# Patient Record
Sex: FEMALE | Employment: FULL TIME | URBAN - METROPOLITAN AREA
[De-identification: names, ages, dates, MRNs, and addresses within clinical notes are randomized per-mention and may not be internally consistent; named-entity substitution may affect disease eponyms.]

---

## 2017-01-04 ENCOUNTER — TELEPHONE (OUTPATIENT)
Dept: ORTHOPEDICS | Facility: CLINIC | Age: 52
End: 2017-01-04

## 2017-01-04 NOTE — TELEPHONE ENCOUNTER
Spoke with patient. She stepped funny this weekend and has had pain and stiffness in her knee since that time.  Has tried to use compression. Getting slightly better each day.  Discussed use of supportive care, ibuprofen, ice, compression.  If not better in the next day or 2 she will follow up.  Lucy Inman MS ATC

## 2017-01-04 NOTE — TELEPHONE ENCOUNTER
Patient LVM. She injured her knee the other day and is wondering if she should be concerned. Would like a call back.

## 2017-01-06 ENCOUNTER — OFFICE VISIT (OUTPATIENT)
Dept: ORTHOPEDICS | Facility: CLINIC | Age: 52
End: 2017-01-06
Payer: COMMERCIAL

## 2017-01-06 VITALS
HEIGHT: 67 IN | BODY MASS INDEX: 34.37 KG/M2 | SYSTOLIC BLOOD PRESSURE: 128 MMHG | WEIGHT: 219 LBS | DIASTOLIC BLOOD PRESSURE: 76 MMHG

## 2017-01-06 DIAGNOSIS — M17.12 PRIMARY OSTEOARTHRITIS OF LEFT KNEE: Primary | ICD-10-CM

## 2017-01-06 PROCEDURE — 99213 OFFICE O/P EST LOW 20 MIN: CPT | Performed by: PEDIATRICS

## 2017-01-06 NOTE — PROGRESS NOTES
"Sports Medicine Clinic Visit    PCP: [facility], [place] [facility]    Magdalena Gordon is a 51 year old female who is seen in f/u up for Primary osteoarthritis of left knee. Since last visit on 16 with Dr. Lerma for a aspiration/injection of a Baker's cyst, the patient has continued to have pain in her left knee.  She did have a slip when she was going down some steps and feel like she abby her knee last week.  She has had pain and soreness in her left knee and thigh since that time.  She has used compression, ice and heat.  She did see her acupuncturist, and does feel that this has helped with the swelling.     Most sore in the anterior aspect of her knee.   **  Was descending stairs, wearing compression at the time, and abby knee. Noted some anterior discoloration, and joint swelling. Swelling now improving. Still has some joint area pain and tenderness.  Limping. Has cut back on activities.  Pain currently is different than typical OA pain. Has noted some benefit in that from acupuncture treatment.  Has noted stiffness since onset of this flare.  Thigh cramped yesterday, used some heat.  Has had knee swelling.  Has had some snapping at knee. Pain more anterolateral aspect, radiates to distal thigh. Also has focal anteromedial pain, tender over anteromedial joint line.    Sitting longer is more painful, causes stiffness. Has noted limited flexion.    Review of Systems  All other systems reviewed and are negative unless noted above.    Past Medical History   Diagnosis Date     Uncomplicated asthma      Circulation problem      Hyperlipemia      Thyroid disease      Headache, migraine      Past Surgical History   Procedure Laterality Date      section, immediate hysterectomy, combined        section, immediate hysterectomy, combined       Thyroid biopsy         Objective  /76 mmHg  Ht 5' 7\" (1.702 m)  Wt 219 lb (99.338 kg)  BMI 34.29 kg/m2    GENERAL APPEARANCE: healthy, alert " and no distress   GAIT: antalgic  SKIN: no suspicious lesions or rashes  NEURO: Normal strength and tone, mentation intact and speech normal  PSYCH:  mentation appears normal and affect normal/bright  HEENT: no scleral icterus or conjunctival erythema  CV: no extremity edema  RESP: nonlabored breathing      Left Knee exam    ROM:        Mild limitation 5-10 deg flexion, stiffness, pain  Extension grossly full, pain actively    Inspection:       no visible ecchymosis      Trace/mild effusion    Skin:       no visible deformities       well perfused       capillary refill brisk    Patellar Motion:        Mild crepitus PF compartment    Tender:        Medial and lateral joint line  Peripatellar  Mild pain with patellar translation    Non Tender:         remainder of knee area    Special Tests:        Mild lateral pain with Hardeep       neg (-) Lachman       neg (-) anterior drawer       neg (-) posterior drawer       neg (-) varus       neg (-) valgus for laxity        Radiology  Prior imaging visualized/reviewed:    MR LEFT KNEE WITHOUT CONTRAST  2/23/2016 9:08 AM     HISTORY:  Evaluate posterior knee, distal hamstring, popliteal space.  Pain in left knee.       TECHNIQUE:  Axial and coronal T2 with fat suppression. Coronal T1.  Sagittal dual echo T2.     FINDINGS:    Medial Meniscus: Mild extrusion of the anterior horn. Moderate  extrusion of the body segment. Intrasubstance degeneration with no  definite articular surface tear, using strict criteria.       Lateral Meniscus: No tear identified.         Anterior Cruciate Ligament: Intact.       Posterior Cruciate Ligament: Intact.       Medial Collateral Ligament: Intact.     Lateral Collateral Ligament Complex: Intact.     Osseous and Cartilaginous Structures: There is a 0.8 cm lesion of the  distal femoral metaphyseal region. The appearance is typical for a  low-grade cartilaginous tumor (often enchondroma), with no aggressive  features seen. Three-compartment  spurring. Spurring around the notch.  Grade II chondromalacia of the lateral tibial plateau. Medial  compartment chondromalacia including some grade IV involvement on both  sides of the joint. Grade II-III patellar chondromalacia. Trochlear  chondromalacia including some grade IV involvement of the medial  trochlea.     Additional Findings: Mild joint effusion. Small Baker's cyst. Fluid in  the semimembranosus-tibial collateral ligament bursa. There is a  ganglion cyst at the posterior aspect of the knee measuring 2.5 cm.  Synovitis.         IMPRESSION:  1. Three-compartment chondromalacia.  2. Mild effusion, with synovitis.  3. Small Baker's cyst.  4. Semimembranosus bursitis.     DANIELA VALENTIN MD      Plain films knees 2/19/16:   Impression: Bilateral Vergara and axial views demonstrate moderate to severe medial compartment narrowing, slightly greater on the right. Mild lateral and patellofemoral compartment degenerative change as well. Tricompartmental osteophytes. No acute osseous findings.  Impression: Single lateral view of the left knee demonstrates diffuse degenerative change, with femorotibial joint space narrowing and patellar osteophytes. No acute osseous findings.      Assessment:  1. Primary osteoarthritis of left knee    aggravation of OA, with pain more from the patellofemoral compartment; this fits given onset with descending stairs.    Plan:  Discussed the assessment with the patient. Reviewed again options for OA of knee. Discussed symptom treatment, activity modification, rehab, imaging, injection therapy, referral to see surgeon.  Topical Treatments: Ice, Heat or Topical Analgesics  Over the counter medication: Patient's preferred OTC medication as directed on packaging.  Prior imaging of the knee reviewed. I don't think additional imaging necessary currently.  Activity Modification: discussed  Rehab: Physical Therapy: external, her request; PT to address her sense of stiffness, instability, LE  strength and control  Injection therapy discussed. Steroid x 2 without lasting benefit. Discussed potential for visco, though she has an allergy to down. Following last phone encounter, I had looked through medical literature regarding this, no specific mention of down. Will reach out to visco rep(s) about this potential, though with her significant systemic reaction and history of atopic disease, I would be very apprehensive to pursue visco.  She is aware ultimate tx option is see ortho surgeon; desires to avoid as long as possible.  Follow up: will leave as needed, pending course with PT.  Questions answered. The patient indicates understanding of these issues and agrees with the plan.    Vishnu Hanson DO, CAQ    Note: Time spent in one-on-one evaluation and discussion with patient regarding nature of problem, course, prior treatments, and therapeutic options, along with review of medical record, at least 50% of which was spent in counseling and coordination of care: 15 minutes.      Disclaimer: This note consists of symbols derived from keyboarding, dictation and/or voice recognition software. As a result, there may be errors in the script that have gone undetected. Please consider this when interpreting information found in this chart.

## 2017-01-06 NOTE — NURSING NOTE
"Chief Complaint   Patient presents with     Musculoskeletal Problem     left knee pain       Initial /76 mmHg  Ht 5' 7\" (1.702 m)  Wt 219 lb (99.338 kg)  BMI 34.29 kg/m2 Estimated body mass index is 34.29 kg/(m^2) as calculated from the following:    Height as of this encounter: 5' 7\" (1.702 m).    Weight as of this encounter: 219 lb (99.338 kg).  BP completed using cuff size: regular  "

## 2017-01-06 NOTE — Clinical Note
Can we reach out to visco rep(s) about potential use of visco in setting of down allergy? I can email, just need contact info. Thanks.

## 2017-01-06 NOTE — MR AVS SNAPSHOT
After Visit Summary   1/6/2017    Magdalena Gordon    MRN: [id number]           Patient Information     Date Of Birth          1965        Visit Information        Provider Department      1/6/2017 9:20 AM Vishnu Hanson,  [facility]        Today's Diagnoses     Primary osteoarthritis of left knee    -  1        Follow-ups after your visit        Additional Services     PHYSICAL THERAPY REFERRAL       *This therapy referral will be filtered to a centralized scheduling office at [facility] and the patient will receive a call to schedule an appointment at a [facility] location most convenient for them. *     [facility] provides Physical Therapy evaluation and treatment and many specialty services across the [facility] system.  If requesting a specialty program, please choose from the list below.    If you have not heard from the scheduling office within 2 business days, please call [phone] for all locations, with the exception of [place], please call [phone].  Treatment: Evaluation & Treatment  Special Instructions/Modalities: [facility]    [facility]  [address]  [place] [address]    PH: [phone]  FX: [phone]  Special Programs: None    Please be aware that coverage of these services is subject to the terms and limitations of your health insurance plan.  Call member services at your health plan with any benefit or coverage questions.      **Note to Provider:  If you are referring outside of [facility] for the therapy appointment, please list the name of the location in the  special instructions  above, print the referral and give to the patient to schedule the appointment.                  Who to contact     If you have questions or need follow up information about today's clinic visit or your schedule please contact [facility] directly at [phone].  Normal or non-critical lab and imaging results will be communicated to you by MyChart, letter or phone within 4 business days after the clinic has received the results. If you do not hear from us within 7 days, please contact the clinic through [email or web address] or phone. If you have a critical or abnormal lab result, we will notify you by phone as soon as possible.  Submit refill requests through [employer] or call your pharmacy and they will forward the refill request to us. Please allow 3 business days for your refill to be completed.          Additional Information About Your Visit        [email or web address] Information     [employer] gives you secure access to your electronic health record. If you see a primary care provider, you can also send messages to your care team and make appointments. If you have questions, please call your primary care clinic.  If you do not have a primary care provider, please call [phone] and they will assist you.        Care EveryWhere ID     This is your Care EveryWhere ID. This could be used by other organizations to access your [facility] medical records  [id number]        Your Vitals Were     Height BMI (Body Mass Index)                5' 7\" (1.702 m) 34.29 kg/m2           Blood Pressure from Last 3 Encounters:   01/06/17 128/76   12/02/16 130/80   09/09/16 136/82    Weight from Last 3 Encounters:   01/06/17 219 lb (99.338 kg)   12/02/16 219 lb (99.338 kg)   09/09/16 220 lb (99.791 kg)              We Performed the Following     PHYSICAL THERAPY REFERRAL        Primary Care Provider Office Phone #    [facility] [phone]       No address on file        Thank you!     Thank you for choosing [facility]  for your care. Our goal is always to provide you with excellent care. Hearing back from our patients is one way we can continue to improve our services. Please take a few minutes to complete the written " survey that you may receive in the mail after your visit with us. Thank you!             Your Updated Medication List - Protect others around you: Learn how to safely use, store and throw away your medicines at www.disposemymeds.org.          This list is accurate as of: 1/6/17 10:19 AM.  Always use your most recent med list.                   Brand Name Dispense Instructions for use    SYNTHROID PO

## 2017-09-10 ENCOUNTER — HEALTH MAINTENANCE LETTER (OUTPATIENT)
Age: 52
End: 2017-09-10

## 2018-09-28 ENCOUNTER — THERAPY VISIT (OUTPATIENT)
Dept: PHYSICAL THERAPY | Facility: CLINIC | Age: 53
End: 2018-09-28
Payer: COMMERCIAL

## 2018-09-28 DIAGNOSIS — H81.11 BENIGN PAROXYSMAL POSITIONAL VERTIGO OF RIGHT EAR: Primary | ICD-10-CM

## 2018-09-28 PROCEDURE — 95992 CANALITH REPOSITIONING PROC: CPT | Mod: GP | Performed by: PHYSICAL THERAPIST

## 2018-09-28 PROCEDURE — 97112 NEUROMUSCULAR REEDUCATION: CPT | Mod: GP | Performed by: PHYSICAL THERAPIST

## 2018-09-28 PROCEDURE — 97161 PT EVAL LOW COMPLEX 20 MIN: CPT | Mod: GP | Performed by: PHYSICAL THERAPIST

## 2018-09-28 NOTE — MR AVS SNAPSHOT
After Visit Summary   9/28/2018    Magdalena Gordon    MRN: [id number]           Patient Information     Date Of Birth          1965        Visit Information        Provider Department      9/28/2018 4:00 PM Vasiliy Rouse, PT [facility]        Today's Diagnoses     Benign paroxysmal positional vertigo of right ear    -  1       Follow-ups after your visit        Who to contact     If you have questions or need follow up information about today's clinic visit or your schedule please contact [facility] directly at [phone].  Normal or non-critical lab and imaging results will be communicated to you by [employer], letter or phone within 4 business days after the clinic has received the results. If you do not hear from us within 7 days, please contact the clinic through [employer] or phone. If you have a critical or abnormal lab result, we will notify you by phone as soon as possible.  Submit refill requests through [employer] or call your pharmacy and they will forward the refill request to us. Please allow 3 business days for your refill to be completed.          Additional Information About Your Visit        MyChart Information     [employer] gives you secure access to your electronic health record. If you see a primary care provider, you can also send messages to your care team and make appointments. If you have questions, please call your primary care clinic.  If you do not have a primary care provider, please call [phone] and they will assist you.        Care EveryWhere ID     This is your Care EveryWhere ID. This could be used by other organizations to access your [facility] medical records  [id number]         Blood Pressure from Last 3 Encounters:   01/06/17 128/76   12/02/16 130/80   09/09/16 136/82    Weight from Last 3 Encounters:   01/06/17 99.3 kg (219 lb)   12/02/16 99.3 kg (219 lb)   09/09/16 99.8 kg (220 lb)              We Performed the Following     CANALITH  REPOSITIONING, PER DAY     HC PT EVAL, LOW COMPLEXITY     [facility] INITIAL EVAL REPORT     NEUROMUSCULAR RE-EDUCATION        Primary Care Provider Office Phone # Fax #    [facility] [phone]       [address]  [place] [address]        Equal Access to Services     DARLENE LOZANO : Hadii aad ku hadasho Soomaali, waaxda luqadaha, qaybta kaalmada adeegyada, waxay idiin hayaan adeeg kharash la'aan ah. So [facility] [phone].    ATENCIÓN: Si habla español, tiene a suh disposición servicios gratuitos de asistencia lingüística. Llame al [phone].    We comply with applicable federal civil rights laws and [place] laws. We do not discriminate on the basis of race, color, national origin, age, disability, sex, sexual orientation, or gender identity.            Thank you!     Thank you for choosing [facility]  for your care. Our goal is always to provide you with excellent care. Hearing back from our patients is one way we can continue to improve our services. Please take a few minutes to complete the written survey that you may receive in the mail after your visit with us. Thank you!             Your Updated Medication List - Protect others around you: Learn how to safely use, store and throw away your medicines at www.disposemymeds.org.          This list is accurate as of 9/28/18  5:29 PM.  Always use your most recent med list.                   Brand Name Dispense Instructions for use Diagnosis    SYNTHROID PO

## 2018-09-28 NOTE — LETTER
[facility]  [address]  [address]  [place] [address]  [phone]    2018    Re: Magdalena Gordon   :   1965  MRN:  [id number]   REFERRING PHYSICIAN:   [facility]  Date of Initial Evaluation:  2018  Visits:  Rxs Used: 1  Reason for Referral:  Benign paroxysmal positional vertigo of right ear    EVALUATION SUMMARY    [facility] Initial Evaluation  Subjective:  Magdalena is a 53 year old patient complaining of dizziness .  Onset of symptoms: 3 days ago       Is this a recurrent problem: yes, 3rd episode   Progression since onset: same   Frequency of episodes/time of day: varies   Duration of episodes: brief   Exacerbating factors: movement   Relieving factors: rest   Previous treatments:  Yes   Special tests/diagnostics performed: yes   Significant medical hx: bppv meds: none   Occupation: manager    Work requirements: none, but does yoga   General health reported by patient: good   Red Flags: none                   Objective:  Cervical ROM screen: neg   Oculomotor/gaze stability screen: +saccodes Rt   Vertebral artery test: NA   Hallpike-Cecil maneuver:  R: +++positive w/ nystagmus   L: neg   Horizontal canal test:  R: ??post   L: neg   Balance testing:  NA     Assessment/Plan:    Patient is a 53 year old female with dizziness  complaints.    Patient has the following significant findings with corresponding treatment plan.                Diagnosis 1:  bppv   Impaired balance - neuro re-education, therapeutic activities and CRM             Re: Magdalena Gordon   :   1965    Therapy Evaluation Codes:   1) History comprised of:   Personal factors that impact the plan of care:      Coping style, Overall behavior pattern and Past/current experiences.    Comorbidity factors that impact the plan of care are:      Asthma, Cancer, Menopausal, Migraines/headaches, Osteoarthritis, Overweight and thyroid.     Medications impacting  care: None.  2) Examination of Body Systems comprised of:   Body structures and functions that impact the plan of care:      vestib .   Activity limitations that impact the plan of care are:      Bending, Squatting/kneeling, Walking and Sleeping.  3) Clinical presentation characteristics are:   Stable/Uncomplicated.  4) Decision-Making    Low complexity using standardized patient assessment instrument and/or measureable assessment of functional outcome.  Cumulative Therapy Evaluation is: Low complexity.    Previous and current functional limitations:  (See Goal Flow Sheet for this information)    Short term and Long term goals: (See Goal Flow Sheet for this information)     Communication ability:  Patient appears to be able to clearly communicate and understand verbal and written communication and follow directions correctly.  Treatment Explanation - The following has been discussed with the patient:   RX ordered/plan of care  Anticipated outcomes  Possible risks and side effects  This patient would benefit from PT intervention to resume normal activities.   Rehab potential is good.    Frequency:  1 X week, once daily  Duration:  for 4 weeks  Discharge Plan:  Achieve all LTG.  Independent in home treatment program.  Reach maximal therapeutic benefit.    Please refer to the daily flowsheet for treatment today, total treatment time and time spent performing 1:1 timed codes.       Thank you for your referral.    INQUIRIES  Therapist: Vasiliy Rouse PT   [facility]  [address]  [address]  [place] [address]  Phone: [phone]  Fax: [phone]

## 2018-09-28 NOTE — PROGRESS NOTES
[facility] Initial Evaluation  Subjective:  HPI                    Objective:  System    Physical Exam    General     ROS    Assessment/Plan:    Patient is a 53 year old female with dizziness  complaints.    Patient has the following significant findings with corresponding treatment plan.                Diagnosis 1:  bppv   Impaired balance - neuro re-education, therapeutic activities and CRM     Therapy Evaluation Codes:   1) History comprised of:   Personal factors that impact the plan of care:      Coping style, Overall behavior pattern and Past/current experiences.    Comorbidity factors that impact the plan of care are:      Asthma, Cancer, Menopausal, Migraines/headaches, Osteoarthritis, Overweight and thyroid.     Medications impacting care: None.  2) Examination of Body Systems comprised of:   Body structures and functions that impact the plan of care:      vestib .   Activity limitations that impact the plan of care are:      Bending, Squatting/kneeling, Walking and Sleeping.  3) Clinical presentation characteristics are:   Stable/Uncomplicated.  4) Decision-Making    Low complexity using standardized patient assessment instrument and/or measureable assessment of functional outcome.  Cumulative Therapy Evaluation is: Low complexity.    Previous and current functional limitations:  (See Goal Flow Sheet for this information)    Short term and Long term goals: (See Goal Flow Sheet for this information)     Communication ability:  Patient appears to be able to clearly communicate and understand verbal and written communication and follow directions correctly.  Treatment Explanation - The following has been discussed with the patient:   RX ordered/plan of care  Anticipated outcomes  Possible risks and side effects  This patient would benefit from PT intervention to resume normal activities.   Rehab potential is good.    Frequency:  1 X week, once daily  Duration:  for 4 weeks  Discharge  Plan:  Achieve all LTG.  Independent in home treatment program.  Reach maximal therapeutic benefit.    Please refer to the daily flowsheet for treatment today, total treatment time and time spent performing 1:1 timed codes.       SShyann Nichols is a 53 year old patient complaining of dizziness .  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

## 2018-10-15 ENCOUNTER — TELEPHONE (OUTPATIENT)
Dept: ENDOCRINOLOGY | Facility: CLINIC | Age: 53
End: 2018-10-15

## 2018-10-15 NOTE — TELEPHONE ENCOUNTER
Message left on voicemail to obtain pathology, surgery, and treatment records. Recently seen at [facility] per Care Everywhere.     Encouraged to return call with any questions.    Hortensia Terrazas LPN  Clinic Coordinator   Adult Endocrinology and Pediatric Speciality Clinics  [facility]

## 2018-10-15 NOTE — TELEPHONE ENCOUNTER
[facility]    Phone Message    May a detailed message be left on voicemail: yes    Reason for Call: Other: Patient is new to Dr. Balderas has an appt on 1/22/19 and would like a call to [place] what records to send. She had thyroid cancer 33 years ago and she just wants to make sure she sends all the correct records. Please call [phone]     Action Taken: Message routed to:  Adult Clinics: Endocrinology p [id number]

## 2018-10-31 ENCOUNTER — OFFICE VISIT (OUTPATIENT)
Dept: ENDOCRINOLOGY | Facility: CLINIC | Age: 53
End: 2018-10-31
Payer: COMMERCIAL

## 2018-10-31 VITALS
HEART RATE: 62 BPM | SYSTOLIC BLOOD PRESSURE: 147 MMHG | BODY MASS INDEX: 35.39 KG/M2 | WEIGHT: 225.97 LBS | DIASTOLIC BLOOD PRESSURE: 87 MMHG | OXYGEN SATURATION: 96 %

## 2018-10-31 DIAGNOSIS — R73.03 PREDIABETES: ICD-10-CM

## 2018-10-31 DIAGNOSIS — E89.0 POSTSURGICAL HYPOTHYROIDISM: ICD-10-CM

## 2018-10-31 DIAGNOSIS — I10 BENIGN ESSENTIAL HYPERTENSION: ICD-10-CM

## 2018-10-31 DIAGNOSIS — C73 PAPILLARY THYROID CARCINOMA (H): Primary | ICD-10-CM

## 2018-10-31 LAB
T4 FREE SERPL-MCNC: 1.41 NG/DL (ref 0.76–1.46)
TSH SERPL DL<=0.005 MIU/L-ACNC: 0.66 MU/L (ref 0.4–4)

## 2018-10-31 PROCEDURE — 99000 SPECIMEN HANDLING OFFICE-LAB: CPT | Performed by: INTERNAL MEDICINE

## 2018-10-31 PROCEDURE — 99204 OFFICE O/P NEW MOD 45 MIN: CPT | Performed by: INTERNAL MEDICINE

## 2018-10-31 PROCEDURE — 86800 THYROGLOBULIN ANTIBODY: CPT | Mod: 90 | Performed by: INTERNAL MEDICINE

## 2018-10-31 PROCEDURE — 84443 ASSAY THYROID STIM HORMONE: CPT | Performed by: INTERNAL MEDICINE

## 2018-10-31 PROCEDURE — 84439 ASSAY OF FREE THYROXINE: CPT | Performed by: INTERNAL MEDICINE

## 2018-10-31 PROCEDURE — 36415 COLL VENOUS BLD VENIPUNCTURE: CPT | Performed by: INTERNAL MEDICINE

## 2018-10-31 PROCEDURE — 84432 ASSAY OF THYROGLOBULIN: CPT | Mod: 90 | Performed by: INTERNAL MEDICINE

## 2018-10-31 RX ORDER — ASPIRIN 325 MG
325 TABLET, DELAYED RELEASE (ENTERIC COATED) ORAL DAILY
COMMUNITY

## 2018-10-31 RX ORDER — FLUTICASONE PROPIONATE 50 MCG
1 SPRAY, SUSPENSION (ML) NASAL DAILY
COMMUNITY

## 2018-10-31 RX ORDER — MULTIVITAMIN,THER AND MINERALS
1 TABLET ORAL DAILY
COMMUNITY

## 2018-10-31 RX ORDER — CHOLECALCIFEROL (VITAMIN D3) 50 MCG
2000 TABLET ORAL DAILY
COMMUNITY

## 2018-10-31 RX ORDER — PHENOL 1.4 %
1 AEROSOL, SPRAY (ML) MUCOUS MEMBRANE DAILY
COMMUNITY

## 2018-10-31 NOTE — MR AVS SNAPSHOT
After Visit Summary   10/31/2018    Magdalena Gordon    MRN: [id number]           Patient Information     Date Of Birth          1965        Visit Information        Provider Department      10/31/2018 9:00 AM Peggy Balderas MD [facility]        Today's Diagnoses     Papillary thyroid carcinoma (H)    -  1    Prediabetes          Care Instructions    Please allow 7-10 business days for your lab results. You will be notified by phone, letter, or My Chart after the provider has reviewed them.  Thank you.             Follow-ups after your visit        Follow-up notes from your care team     Return in about 1 year (around 10/31/2019) for labs today.      Your next 10 appointments already scheduled     Oct 30, 2019  8:00 AM CDT   Return Visit with Peggy Balderas MD   [facility]    [address] [place] [id number] [phone]              Future tests that were ordered for you today     Open Future Orders        Priority Expected Expires Ordered    TSH Routine 10/31/2018 10/31/2019 10/31/2018    T4 free Routine 10/31/2018 10/31/2019 10/31/2018    Thyroglobulin and antibody Routine 10/31/2018 10/31/2019 10/31/2018            Who to contact     If you have questions or need follow up information about today's clinic visit or your schedule please contact [facility] directly at [phone].  Normal or non-critical lab and imaging results will be communicated to you by MyChart, letter or phone within 4 business days after the clinic has received the results. If you do not hear from us within 7 days, please contact the clinic through MyChart or phone. If you have a critical or abnormal lab result, we will notify you by phone as soon as possible.  Submit refill requests through [employer] or call your pharmacy and they will forward the refill request to us. Please allow 3 business days for  your refill to be completed.          Additional Information About Your Visit        [employer] Information     [employer] gives you secure access to your electronic health record. If you see a primary care provider, you can also send messages to your care team and make appointments. If you have questions, please call your primary care clinic.  If you do not have a primary care provider, please call [phone] and they will assist you.      [employer] is an electronic gateway that provides easy, online access to your medical records. With [employer], you can request a clinic appointment, read your test results, renew a prescription or communicate with your care team.     To access your existing account, please contact your [facility] or call [phone] for assistance.        Care EveryWhere ID     This is your Care EveryWhere ID. This could be used by other organizations to access your [place] medical records  [id number]        Your Vitals Were     Pulse Pulse Oximetry BMI (Body Mass Index)             62 96% 35.39 kg/m2          Blood Pressure from Last 3 Encounters:   10/31/18 147/87   01/06/17 128/76   12/02/16 130/80    Weight from Last 3 Encounters:   10/31/18 102.5 kg (225 lb 15.5 oz)   01/06/17 99.3 kg (219 lb)   12/02/16 99.3 kg (219 lb)               Primary Care Provider Office Phone # Fax #    Alee Graves [phone]       [facility] [address] [facility] [address]        Equal Access to Services     DARLENE LOZANO AH: Hadii xiao ku hadasho Soomaali, waaxda luqadaha, qaybta kaalmada adeegyada, michael pierce. So [facility] [phone].    ATENCIÓN: Si habla español, tiene a suh disposición servicios gratuitos de asistencia lingüística. Kelby al [phone].    We comply with applicable federal civil rights laws and [place] laws. We do not discriminate on the basis of race, color, national origin, age, disability, sex, sexual orientation,  or gender identity.            Thank you!     Thank you for choosing [facility]  for your care. Our goal is always to provide you with excellent care. Hearing back from our patients is one way we can continue to improve our services. Please take a few minutes to complete the written survey that you may receive in the mail after your visit with us. Thank you!             Your Updated Medication List - Protect others around you: Learn how to safely use, store and throw away your medicines at www.disposemymeds.org.          This list is accurate as of 10/31/18 10:31 AM.  Always use your most recent med list.                   Brand Name Dispense Instructions for use Diagnosis    ATENOLOL PO      Take 25 mg by mouth daily        NEMESIO ASPIRIN PO      Take 325 mg by mouth daily        calcium carbonate 600 MG tablet    OS-BASSEM 600 mg [place]. [place]     Take 1 tablet by mouth daily        fluticasone 50 MCG/ACT spray    FLONASE     Spray 1 spray into both nostrils daily        fluticasone-salmeterol 250-50 MCG/DOSE diskus inhaler    ADVAIR     Inhale 1 puff into the lungs daily        GLUCOSAMINE-CHONDROITIN-MSM PO      Take 1 tablet by mouth daily        MONTELUKAST SODIUM PO      Take 10 mg by mouth At Bedtime        SYNTHROID PO      Take 175 mcg by mouth daily        vitamin  s/Minerals Tabs      Take 1 tablet by mouth daily        vitamin D 2000 units tablet      Take 2,000 Units by mouth daily

## 2018-10-31 NOTE — NURSING NOTE
Magdalena Gordon's goals for this visit include: consult - thyroid cancer  She requests these members of her care team be copied on today's visit information: Yes    PCP: Alee Graves    Referring Provider:  Referred Self, MD  No address on file    /87 (BP Location: Left arm, Patient Position: Sitting, Cuff Size: Adult Regular)  Pulse 62  Wt 102.5 kg (225 lb 15.5 oz)  SpO2 96%  BMI 35.39 kg/m2    Do you need any medication refills at today's visit? Yes

## 2018-10-31 NOTE — LETTER
10/31/2018         RE: Magdalena Gordon  [address] [facility] [address]        Dear Colleague,    Thank you for referring your patient, Magdalena Gordon, to the [facility]. Please see a copy of my visit note below.      The patient is self-referred for evaluation of thyroid cancer.    Magdalena Gordon is a 53 year old female with a past medical history significant for paroxysmal A. fib, hypertension, headaches, obstructive sleep apnea.  The patient was diagnosed with thyroid cancer in 1985.  I reviewed the printed copy of the outside medical records:  11/7/85 right thyroid lobectomy.  Frozen section at that time was interpreted as probably benign.  Permanent sections revealed papillary carcinoma of the thyroid and the patient underwent elective left thyroid lobectomy the following day.  Pathology report:  Hashimoto thyroiditis  Follicular variant of papillary thyroid carcinoma, 1.5 cm  3 benign peritracheal lymph nodes  A focus of papillary thyroid carcinoma in the left lobe  4/17/86 an ablative dose of 28.5 mCi I-131 was administered.  4/14/86 whole body scan performed 48 hours following I-131 administration.  There was an intense focal area of uptake noted in the thyroid bed, consistent with residual thyroid.  7/30/87 whole body I-131 scan demonstrated no abnormal areas of increased activity.  10/26/88 I-131 whole body scan-no evidence of residual activity in the thyroid area.  No areas of distant metastasis identified.  2/25/2003  Thyroglobulin antibodies less than 2, thyroglobulin less than 0.9 ([facility])    I reviewed outside lab results and imaging tests in care everywhere:  10/21/85   TgAb 1:400 <1:100  TPO antibodies 1:1600   <1:100    5/1/18 TSH 0.61     8/1/18   TSH 0.97 Tg <0.1 TgAb <0.1 ([facility])    2009 chest CT - no nodules     Current dose of levothyroxine is 175 mcg brand Synthroid and the dose has not been changed for years.  She reports  "taking it in the morning, 2 hours prior to having something to eat.  She takes all her supplements at lunch.  In , the patient reports being diagnosed with A. fib, at the time when her TSH was low and she was medicated with generic levothyroxine.  The atrial fibrillation resolved until May this year, when she underwent cardioversion.  Over the summer, she reports having recurrent episodes of atrial fibrillation, short-lived.  She recognizes her heart is in A. fib as she feels like \"the chest has carbonation problems\" and she also develops shortness of breath.  Denies experiencing palpitations.  She knows how to check her heart rate and she reports her heart rate being elevated at the time she experiences the episodes of A. fib.     Magdalena remembers being first told that she has an elevated blood pressure approximately 2 years ago.  As per patient, her blood pressure has been elevated for the last year.  Since being diagnosed with A. fib, she has been taking atenolol.    She has been trying to improve her diet and have less carbohydrates.  Recently, she has noticed some mild jitteriness and increased anxiety.    Past Medical History   Papillary thyroid cancer   Headaches   Asthma   Varicose veins  Allergic rhinitis  PREET mild - uses a mouth guard   Paroxsymal atrial fibrillation  Gestational diabetes with her third child  - missed during the pregnancy and not treated , baby was over 10 lbs weight at delivery   Prediabetes - dx 2005 DEXA normal BMD   Uterine fibroids   Menopause at age 45. 2008 FSH 63.8   Iron deficiency   No fractures   Deviated septum   HTN - BP high for 2 years, in the office - not treated     Past Surgical History   Past Surgical History:   Procedure Laterality Date      SECTION, IMMEDIATE HYSTERECTOMY, COMBINED        SECTION, IMMEDIATE HYSTERECTOMY, COMBINED       THYROID BIOPSY     Benign left breast mass removed surgically     Current " Medications  Prescription Medications as of 10/31/2018             ATENOLOL PO Take 25 mg by mouth daily    NEMESIO ASPIRIN PO Take 325 mg by mouth daily    calcium carbonate (OS-BASSEM 600 MG [place]. [place]) 600 MG tablet Take 1 tablet by mouth daily    Cholecalciferol (VITAMIN D) 2000 units tablet Take 2,000 Units by mouth daily    fluticasone (FLONASE) 50 MCG/ACT spray Spray 1 spray into both nostrils daily    fluticasone-salmeterol (ADVAIR) 250-50 MCG/DOSE diskus inhaler Inhale 1 puff into the lungs daily    GLUCOSAMINE-CHONDROITIN-MSM PO Take 1 tablet by mouth daily    Levothyroxine Sodium (SYNTHROID PO) Take 175 mcg by mouth daily    MONTELUKAST SODIUM PO Take 10 mg by mouth At Bedtime    vitamin  s/Minerals TABS Take 1 tablet by mouth daily          Family History   Problem Relation Age of Onset     Hypothyroidism  Mother      Diabetes type 2  Father      A fib  Father      Diabetes type 2  Maternal Grandfather      Coronary Artery Disease -  of MI in his 70s  Maternal Grandfather      Hashimoto thyroiditis  Sister      Social History  . She has 3 children. She smoked for 30 years, 1 PPD or less, quit 7 years ago. She rarely drinks alcohol. Denies using illicit drugs. 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      Review of Systems   Systemic:              Fatigue, longstanding - hard time getting up in am, occasional headaches in am - at the back of the head ~ once a week  Her weight is up 4 pounds in the last year  Eye:                      No eye symptoms   Denise-Laryngeal:     no dysphagia - occasionally, certain foods get stuck in her throat; voice hoarseness since using the steroid inhaler, no cough     Breast:                  No breast symptoms  Cardiovascular:    As above   Pulmonary:           Some SOB with exertion    Gastrointestinal:   Occasional abd pain and constipation - relieved by changes in her food intake (intake of wheat flour products is associated with constipation).  Genitourinary:        No genitourinary symptoms, no increased thirst or urination   Endocrine:            Longstanding heat intolerance - worse in the last 3 years  Neurological:         no tremor, numbness or tingling sensation in her fingers, no dizziness   Musculoskeletal:  Knees and elbows pain - OA    Skin:                     No skin symptoms, no dry skin, no hair falling out   Psychological:      Was told she has anxiety; denies depression               Vital Signs     Previous Weights:    Wt Readings from Last 10 Encounters:   10/31/18 102.5 kg (225 lb 15.5 oz)   01/06/17 99.3 kg (219 lb)   12/02/16 99.3 kg (219 lb)   09/09/16 99.8 kg (220 lb)   02/19/16 98.4 kg (217 lb)        /87 (BP Location: Left arm, Patient Position: Sitting, Cuff Size: Adult Regular)  Pulse 62  Wt 102.5 kg (225 lb 15.5 oz)  SpO2 96%  BMI 35.39 kg/m2    Physical Exam  General Appearance: general obesity, no distress noted   Eyes:  conjutivae and extra-ocular motions are normal.                                    pupils round and reactive to light, no lid lag, no stare    HEENT:   oropharynx clear and moist, no JVD, no bruits      neck - no palpable nodules   Cardiovascular:  regular rhythm, no murmurs, distal pulse palpable, no edema  Respiratory:        chest clear, no rales, no rhonchi   Gastrointestinal:  abdomen soft, non-tender, non-distended, normal bowel sounds,    no organomegaly  Musculoskeletal:  normal tone and strength  Psychological:          affect and judgment normal  Skin:  benign abd striae; multiple moles - postresection scars on the back    Neurological:  reflexes normal and symmetric, no resting tremor.     Lab Results  I reviewed prior lab results documented in Epic.  No results found for: TSH    Assessment     1.  Follicular variant papillary thyroid cancer, bilateral, with a maximum diameter of 1.5 cm on the right, status post total thyroidectomy in 1985 followed by radioiodine ablation with 28.5 mCi I-131.  Subsequent  whole-body scans have been negative for residual disease/metastasis.  The patient does not remember when was last time she had a thyroid ultrasound done.  Over the years, the thyroglobulin and antithyroglobulin antibodies have remained undetectable, while measured with different assays.    It is my impression she is cured of thyroid cancer.  Debatable whether or not to continue to monitor the thyroglobulin levels, since she has been cancer free for 33 years.  Since we have a more sensitive assay, I recommended to have the thyroglobulin checked.  In the event it is undetectable, no follow-up is required.    2.  Postsurgical hypothyroidism    Recent TSH values have been within normal range.  I reassured the patient the episodes of paroxysmal A. fib are not related to her thyroid hormone levels.  The jitteriness may represent underlying anxiety.  Advised to have the thyroid hormone levels rechecked today.  We are going to adjust the dose of levothyroxine based on lab results.    3.  Hypertension, uncontrolled.  The patient has a follow-up appointment scheduled with cardiology.  I recommended to discuss with them treatment for hypertension.  Counseled on the importance of controlling the blood pressure to target below 130/80.    4.  Prediabetes, diagnosed in 2005  A1c has remained in the prediabetic range over the years.  Counseled on the importance of diet and exercise.    Orders Placed This Encounter   Procedures     TSH     T4 free     Thyroglobulin and antibody                             The thyroid hormone levels are normal. You may continue to take the same dose of levothyroxine.        Again, thank you for allowing me to participate in the care of your patient.        Sincerely,        Peggy Balderas MD

## 2018-10-31 NOTE — PROGRESS NOTES
The patient is self-referred for evaluation of thyroid cancer.    Magdalena Gordon is a 53 year old female with a past medical history significant for paroxysmal A. fib, hypertension, headaches, obstructive sleep apnea.  The patient was diagnosed with thyroid cancer in 1985.  I reviewed the printed copy of the outside medical records:  11/7/85 right thyroid lobectomy.  Frozen section at that time was interpreted as probably benign.  Permanent sections revealed papillary carcinoma of the thyroid and the patient underwent elective left thyroid lobectomy the following day.  Pathology report:  Hashimoto thyroiditis  Follicular variant of papillary thyroid carcinoma, 1.5 cm  3 benign peritracheal lymph nodes  A focus of papillary thyroid carcinoma in the left lobe  4/17/86 an ablative dose of 28.5 mCi I-131 was administered.  4/14/86 whole body scan performed 48 hours following I-131 administration.  There was an intense focal area of uptake noted in the thyroid bed, consistent with residual thyroid.  7/30/87 whole body I-131 scan demonstrated no abnormal areas of increased activity.  10/26/88 I-131 whole body scan-no evidence of residual activity in the thyroid area.  No areas of distant metastasis identified.  2/25/2003  Thyroglobulin antibodies less than 2, thyroglobulin less than 0.9 ([facility])    I reviewed outside lab results and imaging tests in care everywhere:  10/21/85   TgAb 1:400 <1:100  TPO antibodies 1:1600   <1:100    5/1/18 TSH 0.61     8/1/18   TSH 0.97 Tg <0.1 TgAb <0.1 ([facility])    2009 chest CT - no nodules     Current dose of levothyroxine is 175 mcg brand Synthroid and the dose has not been changed for years.  She reports taking it in the morning, 2 hours prior to having something to eat.  She takes all her supplements at lunch.  In 2012, the patient reports being diagnosed with A. fib, at the time when her TSH was low and she was medicated with generic levothyroxine.  " The atrial fibrillation resolved until May this year, when she underwent cardioversion.  Over the summer, she reports having recurrent episodes of atrial fibrillation, short-lived.  She recognizes her heart is in A. fib as she feels like \"the chest has carbonation problems\" and she also develops shortness of breath.  Denies experiencing palpitations.  She knows how to check her heart rate and she reports her heart rate being elevated at the time she experiences the episodes of A. fib.     Magdalena remembers being first told that she has an elevated blood pressure approximately 2 years ago.  As per patient, her blood pressure has been elevated for the last year.  Since being diagnosed with A. fib, she has been taking atenolol.    She has been trying to improve her diet and have less carbohydrates.  Recently, she has noticed some mild jitteriness and increased anxiety.    Past Medical History   Papillary thyroid cancer   Headaches   Asthma   Varicose veins  Allergic rhinitis  PREET mild - uses a mouth guard   Paroxsymal atrial fibrillation  Gestational diabetes with her third child  - missed during the pregnancy and not treated , baby was over 10 lbs weight at delivery   Prediabetes - dx 2005 DEXA normal BMD   Uterine fibroids   Menopause at age 45. 2008 FSH 63.8   Iron deficiency   No fractures   Deviated septum   HTN - BP high for 2 years, in the office - not treated     Past Surgical History   Past Surgical History:   Procedure Laterality Date      SECTION, IMMEDIATE HYSTERECTOMY, COMBINED        SECTION, IMMEDIATE HYSTERECTOMY, COMBINED       THYROID BIOPSY     Benign left breast mass removed surgically     Current Medications  Prescription Medications as of 10/31/2018             ATENOLOL PO Take 25 mg by mouth daily    NEMESIO ASPIRIN PO Take 325 mg by mouth daily    calcium carbonate (OS-BASSEM 600 MG [place]. [place]) 600 MG tablet Take 1 tablet by mouth daily    Cholecalciferol (VITAMIN D) " 2000 units tablet Take 2,000 Units by mouth daily    fluticasone (FLONASE) 50 MCG/ACT spray Spray 1 spray into both nostrils daily    fluticasone-salmeterol (ADVAIR) 250-50 MCG/DOSE diskus inhaler Inhale 1 puff into the lungs daily    GLUCOSAMINE-CHONDROITIN-MSM PO Take 1 tablet by mouth daily    Levothyroxine Sodium (SYNTHROID PO) Take 175 mcg by mouth daily    MONTELUKAST SODIUM PO Take 10 mg by mouth At Bedtime    vitamin  s/Minerals TABS Take 1 tablet by mouth daily          Family History   Problem Relation Age of Onset     Hypothyroidism  Mother      Diabetes type 2  Father      A fib  Father      Diabetes type 2  Maternal Grandfather      Coronary Artery Disease -  of MI in his 70s  Maternal Grandfather      Hashimoto thyroiditis  Sister      Social History  . She has 3 children. She smoked for 30 years, 1 PPD or less, quit 7 years ago. She rarely drinks alcohol. Denies using illicit drugs. 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      Review of Systems   Systemic:              Fatigue, longstanding - hard time getting up in am, occasional headaches in am - at the back of the head ~ once a week  Her weight is up 4 pounds in the last year  Eye:                      No eye symptoms   Denise-Laryngeal:     no dysphagia - occasionally, certain foods get stuck in her throat; voice hoarseness since using the steroid inhaler, no cough     Breast:                  No breast symptoms  Cardiovascular:    As above   Pulmonary:           Some SOB with exertion    Gastrointestinal:   Occasional abd pain and constipation - relieved by changes in her food intake (intake of wheat flour products is associated with constipation).  Genitourinary:       No genitourinary symptoms, no increased thirst or urination   Endocrine:            Longstanding heat intolerance - worse in the last 3 years  Neurological:         no tremor, numbness or tingling sensation in her fingers, no dizziness   Musculoskeletal:  Knees and  elbows pain - OA    Skin:                     No skin symptoms, no dry skin, no hair falling out   Psychological:      Was told she has anxiety; denies depression               Vital Signs     Previous Weights:    Wt Readings from Last 10 Encounters:   10/31/18 102.5 kg (225 lb 15.5 oz)   01/06/17 99.3 kg (219 lb)   12/02/16 99.3 kg (219 lb)   09/09/16 99.8 kg (220 lb)   02/19/16 98.4 kg (217 lb)        /87 (BP Location: Left arm, Patient Position: Sitting, Cuff Size: Adult Regular)  Pulse 62  Wt 102.5 kg (225 lb 15.5 oz)  SpO2 96%  BMI 35.39 kg/m2    Physical Exam  General Appearance: general obesity, no distress noted   Eyes:  conjutivae and extra-ocular motions are normal.                                    pupils round and reactive to light, no lid lag, no stare    HEENT:   oropharynx clear and moist, no JVD, no bruits      neck - no palpable nodules   Cardiovascular:  regular rhythm, no murmurs, distal pulse palpable, no edema  Respiratory:        chest clear, no rales, no rhonchi   Gastrointestinal:  abdomen soft, non-tender, non-distended, normal bowel sounds,    no organomegaly  Musculoskeletal:  normal tone and strength  Psychological:          affect and judgment normal  Skin:  benign abd striae; multiple moles - postresection scars on the back    Neurological:  reflexes normal and symmetric, no resting tremor.     Lab Results  I reviewed prior lab results documented in Epic.  No results found for: TSH    Assessment     1.  Follicular variant papillary thyroid cancer, bilateral, with a maximum diameter of 1.5 cm on the right, status post total thyroidectomy in 1985 followed by radioiodine ablation with 28.5 mCi I-131.  Subsequent whole-body scans have been negative for residual disease/metastasis.  The patient does not remember when was last time she had a thyroid ultrasound done.  Over the years, the thyroglobulin and antithyroglobulin antibodies have remained undetectable, while measured with  different assays.    It is my impression she is cured of thyroid cancer.  Debatable whether or not to continue to monitor the thyroglobulin levels, since she has been cancer free for 33 years.  Since we have a more sensitive assay, I recommended to have the thyroglobulin checked.  In the event it is undetectable, no follow-up is required.    2.  Postsurgical hypothyroidism    Recent TSH values have been within normal range.  I reassured the patient the episodes of paroxysmal A. fib are not related to her thyroid hormone levels.  The jitteriness may represent underlying anxiety.  Advised to have the thyroid hormone levels rechecked today.  We are going to adjust the dose of levothyroxine based on lab results.    3.  Hypertension, uncontrolled.  The patient has a follow-up appointment scheduled with cardiology.  I recommended to discuss with them treatment for hypertension.  Counseled on the importance of controlling the blood pressure to target below 130/80.    4.  Prediabetes, diagnosed in 2005  A1c has remained in the prediabetic range over the years.  Counseled on the importance of diet and exercise.    Orders Placed This Encounter   Procedures     TSH     T4 free     Thyroglobulin and antibody

## 2018-11-03 PROBLEM — R73.03 PREDIABETES: Status: ACTIVE | Noted: 2018-11-03

## 2018-11-03 PROBLEM — I10 BENIGN ESSENTIAL HYPERTENSION: Status: ACTIVE | Noted: 2018-11-03

## 2018-11-03 PROBLEM — E89.0 POSTSURGICAL HYPOTHYROIDISM: Status: ACTIVE | Noted: 2018-11-03

## 2018-11-07 LAB — LAB SCANNED RESULT: NORMAL

## 2018-12-27 PROBLEM — H81.11 BENIGN PAROXYSMAL POSITIONAL VERTIGO OF RIGHT EAR: Status: RESOLVED | Noted: 2018-09-28 | Resolved: 2018-12-27

## 2019-10-30 ENCOUNTER — OFFICE VISIT (OUTPATIENT)
Dept: ENDOCRINOLOGY | Facility: CLINIC | Age: 54
End: 2019-10-30
Payer: COMMERCIAL

## 2019-10-30 VITALS
WEIGHT: 235.2 LBS | HEIGHT: 67 IN | HEART RATE: 70 BPM | DIASTOLIC BLOOD PRESSURE: 86 MMHG | OXYGEN SATURATION: 95 % | SYSTOLIC BLOOD PRESSURE: 154 MMHG | BODY MASS INDEX: 36.91 KG/M2

## 2019-10-30 DIAGNOSIS — C73 PAPILLARY THYROID CARCINOMA (H): Primary | ICD-10-CM

## 2019-10-30 DIAGNOSIS — E66.01 CLASS 2 SEVERE OBESITY DUE TO EXCESS CALORIES WITH SERIOUS COMORBIDITY AND BODY MASS INDEX (BMI) OF 36.0 TO 36.9 IN ADULT (H): ICD-10-CM

## 2019-10-30 DIAGNOSIS — E66.812 CLASS 2 SEVERE OBESITY DUE TO EXCESS CALORIES WITH SERIOUS COMORBIDITY AND BODY MASS INDEX (BMI) OF 36.0 TO 36.9 IN ADULT (H): ICD-10-CM

## 2019-10-30 DIAGNOSIS — E89.0 POSTSURGICAL HYPOTHYROIDISM: ICD-10-CM

## 2019-10-30 LAB
T4 FREE SERPL-MCNC: 1.42 NG/DL (ref 0.76–1.46)
TSH SERPL DL<=0.005 MIU/L-ACNC: 2.76 MU/L (ref 0.4–4)

## 2019-10-30 PROCEDURE — 84439 ASSAY OF FREE THYROXINE: CPT | Performed by: INTERNAL MEDICINE

## 2019-10-30 PROCEDURE — 99214 OFFICE O/P EST MOD 30 MIN: CPT | Performed by: INTERNAL MEDICINE

## 2019-10-30 PROCEDURE — 36415 COLL VENOUS BLD VENIPUNCTURE: CPT | Performed by: INTERNAL MEDICINE

## 2019-10-30 PROCEDURE — 84443 ASSAY THYROID STIM HORMONE: CPT | Performed by: INTERNAL MEDICINE

## 2019-10-30 ASSESSMENT — MIFFLIN-ST. JEOR: SCORE: 1688.36

## 2019-10-30 NOTE — NURSING NOTE
"Magdalena Gordon's goals for this visit include:   Chief Complaint   Patient presents with     Thyroid Disease     She requests these members of her care team be copied on today's visit information: Yes    PCP: Alee Graves    Referring Provider:  Alee Graves  [facility]  [address] [address]    BP (!) 154/86 (BP Location: Left arm, Patient Position: Sitting, Cuff Size: Adult Large)   Pulse 70   Ht 1.7 m (5' 6.93\")   Wt 106.7 kg (235 lb 3.2 oz)   SpO2 95%   BMI 36.92 kg/m      Do you need any medication refills at today's visit? Yes, but will wait for lab results    "

## 2019-10-30 NOTE — PROGRESS NOTES
The patient is seen in f/up for thyroid cancer.    Magdalena Gordon is a 54 year old female with a past medical history significant for paroxysmal A. fib, hypertension, headaches, obstructive sleep apnea.  The patient was diagnosed with thyroid cancer in 1985.   Outside medical records:  11/7/85 right thyroid lobectomy.  Frozen section at that time was interpreted as probably benign.  Permanent sections revealed papillary carcinoma of the thyroid and the patient underwent elective left thyroid lobectomy the following day.  Pathology report:  Hashimoto thyroiditis  Follicular variant of papillary thyroid carcinoma, 1.5 cm  3 benign peritracheal lymph nodes  A focus of papillary thyroid carcinoma in the left lobe  4/17/86 an ablative dose of 28.5 mCi I-131 was administered.  4/14/86 whole body scan performed 48 hours following I-131 administration.  There was an intense focal area of uptake noted in the thyroid bed, consistent with residual thyroid.  7/30/87 whole body I-131 scan demonstrated no abnormal areas of increased activity.  10/26/88 I-131 whole body scan-no evidence of residual activity in the thyroid area.  No areas of distant metastasis identified.  2009 chest CT - no nodules     2/25/2003  Thyroglobulin antibodies less than 2, thyroglobulin less than 0.9 ([facility])    I reviewed outside lab results and imaging tests in care everywhere:  10/21/85   TgAb 1:400 <1:100  TPO antibodies 1:1600   <1:100    8/1/18   TSH 0.97 Tg <0.1 TgAb <0.1 ([facility])    10/31/18  TSH 0.66  Tg<0.1 TgAb<0.4 ([facility])    She is concerned about her inability to lose weight.  Her weight is up 10 pounds in the last year and she attributes this to decreased exercise and emotional eating.  She is contemplating knee replacement surgery.  Intermittently, she continues to experience episodes of atrial fibrillation.  Prior to the knee surgery, she is going to undergo a stress test, in January this year.  Today,  her blood pressure was elevated and the patient attributes this to increased stress.  During her recent visit to her dentist, the blood pressure was normal.  She has not been using the mouthguard which was recommended to control the sleep apnea.  It was suggested to pursue surgery for deviated septum, as this might improve her breathing.  In the past, she did try a facemask for sleep apnea but she was not able to tolerate it due to nightmares.  Denies depression.    Current dose of levothyroxine is 175 mcg brand Synthroid and the dose has not been changed for years.      Past Medical History   Papillary thyroid cancer   Headaches   Asthma   Varicose veins  Allergic rhinitis  PREET mild - uses a mouth guard   Paroxsymal atrial fibrillation  Gestational diabetes with her third child  - missed during the pregnancy and not treated , baby was over 10 lbs weight at delivery   Prediabetes - dx 2005 DEXA normal BMD   Uterine fibroids   Menopause at age 45. 2008 FSH 63.8   Iron deficiency   No fractures   Deviated septum   HTN - BP high for 2 years, in the office - not treated     Past Surgical History   Past Surgical History:   Procedure Laterality Date      SECTION, IMMEDIATE HYSTERECTOMY, COMBINED        SECTION, IMMEDIATE HYSTERECTOMY, COMBINED       THYROID BIOPSY     Benign left breast mass removed surgically     Current Medications    Current Outpatient Medications:      aspirin (NEMESIO ASPIRIN) 325 MG EC tablet, Take 325 mg by mouth daily , Disp: , Rfl:      ATENOLOL PO, Take 25 mg by mouth daily, Disp: , Rfl:      Cholecalciferol (VITAMIN D) 2000 units tablet, Take 2,000 Units by mouth daily, Disp: , Rfl:      Fexofenadine HCl (ALLEGRA PO), Take 180 mg by mouth daily, Disp: , Rfl:      fluticasone-salmeterol (ADVAIR) 250-50 MCG/DOSE diskus inhaler, Inhale 1 puff into the lungs daily, Disp: , Rfl:      Levothyroxine Sodium (SYNTHROID PO), Take 175 mcg by mouth daily, Disp: , Rfl:       MONTELUKAST SODIUM PO, Take 10 mg by mouth At Bedtime, Disp: , Rfl:      vitamin  s/Minerals TABS, Take 1 tablet by mouth daily, Disp: , Rfl:      calcium carbonate (OS-BASSEM 600 MG [place]. [place]) 600 MG tablet, Take 1 tablet by mouth daily, Disp: , Rfl:      fluticasone (FLONASE) 50 MCG/ACT spray, Spray 1 spray into both nostrils daily, Disp: , Rfl:     Family History   Problem Relation Age of Onset     Hypothyroidism  Mother      Diabetes type 2  Father      A fib  Father      Diabetes type 2  Maternal Grandfather      Coronary Artery Disease -  of MI in his 70s  Maternal Grandfather      Hashimoto thyroiditis  Sister      Social History  . She has 3 children. She smoked for 30 years, 1 PPD or less, quit 7 years ago. She rarely drinks alcohol. Denies using illicit drugs. 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      Review of Systems   Systemic:             As above   Eye:                      No eye symptoms   Denise-Laryngeal:     no dysphagia - occasionally, certain foods get stuck in her throat (dry bread); voice hoarseness since using the steroid inhaler, no cough     Breast:                  No breast symptoms  Cardiovascular:    As above   Pulmonary:           Some SOB with exertion    Gastrointestinal:   Occasional abd pain and constipation - relieved by changes in her food intake (intake of wheat flour products is associated with constipation).  Genitourinary:       No genitourinary symptoms, no increased thirst or urination   Endocrine:            Longstanding heat intolerance   Neurological:        no tremor, no numbness or tingling sensation, no dizziness   Musculoskeletal:  Knees and elbows pain - OA    Skin:                     No skin symptoms, no dry skin, no hair falling out   Psychological:      No depression, overwhelmed by her medical problems               Vital Signs     Previous Weights:    Wt Readings from Last 10 Encounters:   10/30/19 106.7 kg (235 lb 3.2 oz)   10/31/18 102.5 kg (225  "lb 15.5 oz)   01/06/17 99.3 kg (219 lb)   12/02/16 99.3 kg (219 lb)   09/09/16 99.8 kg (220 lb)   02/19/16 98.4 kg (217 lb)        BP (!) 154/86 (BP Location: Left arm, Patient Position: Sitting, Cuff Size: Adult Large)   Pulse 70   Ht 1.7 m (5' 6.93\")   Wt 106.7 kg (235 lb 3.2 oz)   SpO2 95%   BMI 36.92 kg/m      Physical Exam  General Appearance: general obesity, no distress noted   Eyes:  conjutivae and extra-ocular motions are normal.                                    pupils round and reactive to light, no lid lag, no stare    HEENT:   oropharynx clear and moist, no JVD, no bruits      neck - no palpable nodules   Cardiovascular:  regular rhythm, no murmurs, distal pulse palpable, no edema  Respiratory:        chest clear, no rales, no rhonchi   Gastrointestinal:  abdomen soft, non-tender, non-distended, normal bowel sounds,    no organomegaly  Musculoskeletal:  normal tone and strength  Psychological:          affect and judgment normal  Skin:  benign abd striae; multiple moles - postresection scars on the back    Neurological:  reflexes normal and symmetric, no resting tremor.     Lab Results  I reviewed prior lab results documented in Epic.  TSH   Date Value Ref Range Status   10/31/2018 0.66 0.40 - 4.00 mU/L Final   No results found for: A1C    Assessment     1.  Follicular variant papillary thyroid cancer, bilateral, with a maximum diameter of 1.5 cm on the right, status post total thyroidectomy in 1985 followed by radioiodine ablation with 28.5 mCi I-131.  Subsequent whole-body scans have been negative for residual disease/metastasis.  The patient does not remember when was last time she had a thyroid ultrasound done.  Over the years, the thyroglobulin and antithyroglobulin antibodies have remained undetectable, when measured with different assays.    I do not think she requires further monitoring for thyroid cancer.      2.  Postsurgical hypothyroidism    Clinically, the patient does not endorse " definite signs or symptoms suggestive of hypo-or hyperthyroidism.  Her weight is up 10 pounds in the last year.  I recommended to have the thyroid hormone levels rechecked today.    3.  Obesity  I spent a considerable amount of time counseling the patient on the importance of diet.  It has been difficult for her to exercise due to her knee problems.  Discussed about the importance on focusing not only on healthy food choices but also on the overall caloric intake. I recommended a target caloric intake of 9661-8848 Kcal daily. General guidelines provided regarding increasing the amount of fruits and vegetables.     Orders Placed This Encounter   Procedures     TSH     T4 free

## 2019-10-30 NOTE — PATIENT INSTRUCTIONS
Please call [phone] to schedule an appointment for weight management. If you prefer to be seen at [place] location, Dr. Freedman will see patients for the first visit at the [facility]/weight management clinic and will see you at [place] for your follow up appointments.

## 2019-10-30 NOTE — LETTER
10/30/2019         RE: Magdalena Gordon  [id number] [facility] [id number]        Dear Colleague,    Thank you for referring your patient, Magdalena Gordon, to the [facility]. Please see a copy of my visit note below.      The patient is seen in f/up for thyroid cancer.    Magdalena Gordon is a 54 year old female with a past medical history significant for paroxysmal A. fib, hypertension, headaches, obstructive sleep apnea.  The patient was diagnosed with thyroid cancer in 1985.   Outside medical records:  11/7/85 right thyroid lobectomy.  Frozen section at that time was interpreted as probably benign.  Permanent sections revealed papillary carcinoma of the thyroid and the patient underwent elective left thyroid lobectomy the following day.  Pathology report:  Hashimoto thyroiditis  Follicular variant of papillary thyroid carcinoma, 1.5 cm  3 benign peritracheal lymph nodes  A focus of papillary thyroid carcinoma in the left lobe  4/17/86 an ablative dose of 28.5 mCi I-131 was administered.  4/14/86 whole body scan performed 48 hours following I-131 administration.  There was an intense focal area of uptake noted in the thyroid bed, consistent with residual thyroid.  7/30/87 whole body I-131 scan demonstrated no abnormal areas of increased activity.  10/26/88 I-131 whole body scan-no evidence of residual activity in the thyroid area.  No areas of distant metastasis identified.  2009 chest CT - no nodules     2/25/2003  Thyroglobulin antibodies less than 2, thyroglobulin less than 0.9 ([facility])    I reviewed outside lab results and imaging tests in care everywhere:  10/21/85   TgAb 1:400 <1:100  TPO antibodies 1:1600   <1:100    8/1/18   TSH 0.97 Tg <0.1 TgAb <0.1 ([facility])    10/31/18  TSH 0.66  Tg<0.1 TgAb<0.4 ([facility])    She is concerned about her inability to lose weight.  Her weight is up 10 pounds in the last year and she attributes this to  decreased exercise and emotional eating.  She is contemplating knee replacement surgery.  Intermittently, she continues to experience episodes of atrial fibrillation.  Prior to the knee surgery, she is going to undergo a stress test, in January this year.  Today, her blood pressure was elevated and the patient attributes this to increased stress.  During her recent visit to her dentist, the blood pressure was normal.  She has not been using the mouthguard which was recommended to control the sleep apnea.  It was suggested to pursue surgery for deviated septum, as this might improve her breathing.  In the past, she did try a facemask for sleep apnea but she was not able to tolerate it due to nightmares.  Denies depression.    Current dose of levothyroxine is 175 mcg brand Synthroid and the dose has not been changed for years.      Past Medical History   Papillary thyroid cancer   Headaches   Asthma   Varicose veins  Allergic rhinitis  PREET mild - uses a mouth guard   Paroxsymal atrial fibrillation  Gestational diabetes with her third child  - missed during the pregnancy and not treated , baby was over 10 lbs weight at delivery   Prediabetes - dx 2005 DEXA normal BMD   Uterine fibroids   Menopause at age 45.  FSH 63.8   Iron deficiency   No fractures   Deviated septum   HTN - BP high for 2 years, in the office - not treated     Past Surgical History   Past Surgical History:   Procedure Laterality Date      SECTION, IMMEDIATE HYSTERECTOMY, COMBINED        SECTION, IMMEDIATE HYSTERECTOMY, COMBINED       THYROID BIOPSY     Benign left breast mass removed surgically     Current Medications    Current Outpatient Medications:      aspirin (NEMESIO ASPIRIN) 325 MG EC tablet, Take 325 mg by mouth daily , Disp: , Rfl:      ATENOLOL PO, Take 25 mg by mouth daily, Disp: , Rfl:      Cholecalciferol (VITAMIN D) 2000 units tablet, Take 2,000 Units by mouth daily, Disp: , Rfl:      Fexofenadine  HCl (ALLEGRA PO), Take 180 mg by mouth daily, Disp: , Rfl:      fluticasone-salmeterol (ADVAIR) 250-50 MCG/DOSE diskus inhaler, Inhale 1 puff into the lungs daily, Disp: , Rfl:      Levothyroxine Sodium (SYNTHROID PO), Take 175 mcg by mouth daily, Disp: , Rfl:      MONTELUKAST SODIUM PO, Take 10 mg by mouth At Bedtime, Disp: , Rfl:      vitamin  s/Minerals TABS, Take 1 tablet by mouth daily, Disp: , Rfl:      calcium carbonate (OS-BASSEM 600 MG [place]. [place]) 600 MG tablet, Take 1 tablet by mouth daily, Disp: , Rfl:      fluticasone (FLONASE) 50 MCG/ACT spray, Spray 1 spray into both nostrils daily, Disp: , Rfl:     Family History   Problem Relation Age of Onset     Hypothyroidism  Mother      Diabetes type 2  Father      A fib  Father      Diabetes type 2  Maternal Grandfather      Coronary Artery Disease -  of MI in his 70s  Maternal Grandfather      Hashimoto thyroiditis  Sister      Social History  . She has 3 children. She smoked for 30 years, 1 PPD or less, quit 7 years ago. She rarely drinks alcohol. Denies using illicit drugs. 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      Review of Systems   Systemic:             As above   Eye:                      No eye symptoms   Denise-Laryngeal:     no dysphagia - occasionally, certain foods get stuck in her throat (dry bread); voice hoarseness since using the steroid inhaler, no cough     Breast:                  No breast symptoms  Cardiovascular:    As above   Pulmonary:           Some SOB with exertion    Gastrointestinal:   Occasional abd pain and constipation - relieved by changes in her food intake (intake of wheat flour products is associated with constipation).  Genitourinary:       No genitourinary symptoms, no increased thirst or urination   Endocrine:            Longstanding heat intolerance   Neurological:        no tremor, no numbness or tingling sensation, no dizziness   Musculoskeletal:  Knees and elbows pain - OA    Skin:                     No skin  "symptoms, no dry skin, no hair falling out   Psychological:      No depression, overwhelmed by her medical problems               Vital Signs     Previous Weights:    Wt Readings from Last 10 Encounters:   10/30/19 106.7 kg (235 lb 3.2 oz)   10/31/18 102.5 kg (225 lb 15.5 oz)   01/06/17 99.3 kg (219 lb)   12/02/16 99.3 kg (219 lb)   09/09/16 99.8 kg (220 lb)   02/19/16 98.4 kg (217 lb)        BP (!) 154/86 (BP Location: Left arm, Patient Position: Sitting, Cuff Size: Adult Large)   Pulse 70   Ht 1.7 m (5' 6.93\")   Wt 106.7 kg (235 lb 3.2 oz)   SpO2 95%   BMI 36.92 kg/m       Physical Exam  General Appearance: general obesity, no distress noted   Eyes:  conjutivae and extra-ocular motions are normal.                                    pupils round and reactive to light, no lid lag, no stare    HEENT:   oropharynx clear and moist, no JVD, no bruits      neck - no palpable nodules   Cardiovascular:  regular rhythm, no murmurs, distal pulse palpable, no edema  Respiratory:        chest clear, no rales, no rhonchi   Gastrointestinal:  abdomen soft, non-tender, non-distended, normal bowel sounds,    no organomegaly  Musculoskeletal:  normal tone and strength  Psychological:          affect and judgment normal  Skin:  benign abd striae; multiple moles - postresection scars on the back    Neurological:  reflexes normal and symmetric, no resting tremor.     Lab Results  I reviewed prior lab results documented in Epic.  TSH   Date Value Ref Range Status   10/31/2018 0.66 0.40 - 4.00 mU/L Final   No results found for: A1C    Assessment     1.  Follicular variant papillary thyroid cancer, bilateral, with a maximum diameter of 1.5 cm on the right, status post total thyroidectomy in 1985 followed by radioiodine ablation with 28.5 mCi I-131.  Subsequent whole-body scans have been negative for residual disease/metastasis.  The patient does not remember when was last time she had a thyroid ultrasound done.  Over the years, the " thyroglobulin and antithyroglobulin antibodies have remained undetectable, when measured with different assays.    I do not think she requires further monitoring for thyroid cancer.      2.  Postsurgical hypothyroidism    Clinically, the patient does not endorse definite signs or symptoms suggestive of hypo-or hyperthyroidism.  Her weight is up 10 pounds in the last year.  I recommended to have the thyroid hormone levels rechecked today.    3.  Obesity  I spent a considerable amount of time counseling the patient on the importance of diet.  It has been difficult for her to exercise due to her knee problems.  Discussed about the importance on focusing not only on healthy food choices but also on the overall caloric intake. I recommended a target caloric intake of 5589-7861 Kcal daily. General guidelines provided regarding increasing the amount of fruits and vegetables.     Orders Placed This Encounter   Procedures     TSH     T4 free                             Again, thank you for allowing me to participate in the care of your patient.        Sincerely,        Peggy Balderas MD

## 2019-10-31 DIAGNOSIS — C73 PAPILLARY THYROID CARCINOMA (H): Primary | ICD-10-CM

## 2019-10-31 RX ORDER — LEVOTHYROXINE SODIUM 175 MCG
175 TABLET ORAL DAILY
Qty: 90 TABLET | Refills: 3 | Status: SHIPPED | OUTPATIENT
Start: 2019-10-31

## 2019-11-08 ENCOUNTER — HEALTH MAINTENANCE LETTER (OUTPATIENT)
Age: 54
End: 2019-11-08

## 2019-11-15 NOTE — PROGRESS NOTES
"New Weight Management Nutrition Consultation    Magdalena Gordon is a 54 year old female presents today for new weight management nutrition consultation.  Patient referred by Dr. Freedman on November 19, 2019.    Patient with Co-morbidities of obesity including:  Type II DM no  Renal Failure no  Sleep apnea yes  Hypertension yes   Dyslipidemia yes  Joint pain yes  Back pain no  GERD no     Anthropometrics:  Estimated body mass index is 37.35 kg/m  as calculated from the following:    Height as of this encounter: 1.702 m (5' 7\").    Weight as of this encounter: 108.2 kg (238 lb 8 oz).    Medications for Weight Loss:  None    NUTRITION HISTORY  See MD note for details.  - Increased stress with work recently, eating to cope/reward   - H/o of thyroid cancer  - Many pt reported food sensitivities: Wheat, dairy, avocados   - Strong sweet cravings  - Trying to limit bread to less than 1 pc/day  - mild sleep apnea, sleeping ~7 hours/night  - Reports portion control is easier for her, but struggles with healthy food choice   - Has done Whole 30 in the past and lost a few pounds      Recent food recall:  Breakfast: eggs, olives (4-5), apple/oranges; ana bread; sour dough; bagel and fruit   Lunch: Bowl of chopped salad (veg + olive + hard cheese) occ with tuna; bone broth soup with bread   Dinner: Skips sometimes; Protein + potato/sweet potato + veg  Snacks: cookie, cup cake, jelly beans, chocolate (Dove)    Beverages: Black coffee, water, sparkling water, soda (2x/month)    Alcohol: one drink each time (wine, mixed drink- bourban and gingerale, whiskey sour), once per week   Dining out: One week per month is dining-out for 5 days while traveling, dining out twice per week with sons football while home. Japanese restaurants (fish, rice, veg), Jamaican tapas (caputo wrapped figs, bread with olive oil, olives, hummas), Pub (fried fish and mashed pot), Yi salad from hotel; Bakery (macaroons and apple);     Physical " Activity:  Walking daily around the block after getting up. Limited by arthritic knee for increased exercise.     MALNUTRITION  % Intake: No decreased intake noted  % Weight Loss: None noted  Subcutaneous Fat Loss: None observed  Muscle Loss: None observed  Fluid Accumulation/Edema: None noted  Malnutrition Diagnosis: Patient does not meet two of the above criteria necessary for diagnosing malnutrition    Nutrition Prescription  Recommended energy/nutrient modification.  Volumetrics    Nutrition Diagnosis  1) Food and nutrition related knowledge deficit r/t lack of prior exposure to formal nutrition education aeb pt unable to verbalize understanding of Volumetrics diet for weight loss.    2) Obesity r/t long history of self-monitoring deficit and excessive energy intake aeb BMI >30.    Nutrition Intervention  Materials/education provided on Volumetric eating to help satiety level on fewer calories; portion control and healthy food choices (Plate Method handout), protein needs, lean protein sources, and mindful eating. Discussed using the plate method to structure balanced meals TID, and measuring out/limiting high-fat foods. Encouraged pt to apply the Plate Method meal structure while dining out, and discussed tips to do so. Discussed strategy for avoiding cravings and being more mindful with food choices. Discussed effect sleep and stress may have on weight management. Discussed benefit of seeing a health psychologist. Provided pt with list of goals and RD contact info.      Patient Understanding: good  Expected Compliance: good  Follow-Up Plans: Meal planning     Nutrition Goals  1) Eat 3 meals per day with protein at each meal (tuna, salmon, eggs, chicken)  2) Dining out: Plan meals in advance. Look at menu online to select entree before going. Portion out half the meal and box the rest up.   3) Measure out nuts, seeds, olives and cheese   4) Find alternatives to emotional eating. Try taking a walk, or breathing  exercises, or journaling.    5) Make an appointment with a Health Psychologist. Make an appointment with Evelyne Mix or Macarena Pope.     Follow-Up:  PRN    Time spent with patient: 45 minutes.  Louisa Cain RD, LD

## 2019-11-19 ENCOUNTER — ALLIED HEALTH/NURSE VISIT (OUTPATIENT)
Dept: SURGERY | Facility: CLINIC | Age: 54
End: 2019-11-19
Payer: COMMERCIAL

## 2019-11-19 ENCOUNTER — OFFICE VISIT (OUTPATIENT)
Dept: ENDOCRINOLOGY | Facility: CLINIC | Age: 54
End: 2019-11-19
Payer: COMMERCIAL

## 2019-11-19 VITALS
TEMPERATURE: 98.3 F | WEIGHT: 238.54 LBS | OXYGEN SATURATION: 94 % | BODY MASS INDEX: 37.44 KG/M2 | HEIGHT: 67 IN | SYSTOLIC BLOOD PRESSURE: 137 MMHG | HEART RATE: 60 BPM | DIASTOLIC BLOOD PRESSURE: 80 MMHG

## 2019-11-19 VITALS — BODY MASS INDEX: 37.43 KG/M2 | WEIGHT: 238.5 LBS | HEIGHT: 67 IN

## 2019-11-19 DIAGNOSIS — E66.812 CLASS 2 SEVERE OBESITY DUE TO EXCESS CALORIES WITH SERIOUS COMORBIDITY AND BODY MASS INDEX (BMI) OF 36.0 TO 36.9 IN ADULT (H): Primary | ICD-10-CM

## 2019-11-19 DIAGNOSIS — E66.01 CLASS 2 SEVERE OBESITY DUE TO EXCESS CALORIES WITH SERIOUS COMORBIDITY AND BODY MASS INDEX (BMI) OF 36.0 TO 36.9 IN ADULT (H): Primary | ICD-10-CM

## 2019-11-19 ASSESSMENT — MIFFLIN-ST. JEOR
SCORE: 1704.46
SCORE: 1704.63

## 2019-11-19 ASSESSMENT — PAIN SCALES - GENERAL: PAINLEVEL: MODERATE PAIN (4)

## 2019-11-19 NOTE — PATIENT INSTRUCTIONS
Nutrition Goals  1) Eat 3 meals per day with protein at each meal (tuna, salmon, eggs, chicken)  2) Dining out: Plan meals in advance. Look at menu online to select entree before going. Portion out half the meal and box the rest up.   3) Measure out nuts, seeds, olives and cheese   4) Find alternatives to emotional eating. Try taking a walk, or breathing exercises, or journaling.    5) Make an appointment with a Health Psychologist. Make an appointment with Aure Magana, Evelyne Edmondson or Macarena Pope.       Follow-up with RD in one month or as needed.    Louisa Cain RD, LD  If you would like to schedule or reschedule an appointment with the RD, please call [phone]

## 2019-11-19 NOTE — PATIENT INSTRUCTIONS
"Welcome to our weight management program!   We are excited to join you on your weight loss journey    Thank you for allowing us the privilege of caring for you. We hope we provided you with the excellent service you deserve.    Please let us know if there is anything else we can do so that we can be sure you are leaving completely satisfied with your care experience.    You saw Dr. Tano MILES today.    Instructions per today's visit:   Consider health coaching visits  Consider health psychology    Follow up appointments:  Dr. Freedman in 2-3 months  Dietitian today and monthly as needed    Interested in working with a health ?  Health coaches work with you to improve your overall health and wellbeing.  They look at the whole person, and may involve discussion of different areas of life, including, but not limited to the four pillars of health (sleep, exercise, nutrition, and stress management). Discuss with your care team if you would like to start working a health .  Health Coaching-3 Pack:    $99 for three health coaching visits    Visits may be done in person or via phone    Coaching is a partnership between the  and the client; Coaches do not prescribe or diagnose    Coaching helps inspire the client to reach his/her personal goals   10 Tips for Healthy Holidays:   1. Continue to eat 3 meals daily and avoid snacks. Do not skip meals to \"save\" calories for holiday meal, you will likely overeat.   2. Eat slowly and stop as soon as you are satisfied.   3. Stay away from the buffet table or appetizers prior to the meal. This leads to snacking between meals or filling up on snacks prior to the meal, which can lead to overeating.   4. During the meal eat your protein first, then the vegetables and last the starchy dishes. Again remember to stop as soon as you feel satisfied; it is okay to leave food on the plate.   5. Try water, non-alcoholic wine (does have calories), crystal light, merlin and other low " caloric beverage, put them in a fancy glass with a slice of lemon or lime to make them look nice.    6. Drink plenty of water.  7. Bring a dish to share, therefore you know that there will be a healthier option to eat.   8. Continue your current exercise routine, it is easier to fall out of your exercise routine than to get back into the habit of exercising after the holidays. Try to do a family activity outside or ask if anyone wants to take a walk pre or post meal.   9. Be kind to yourself, if you over indulged that's okay, all is not lost. Re-commit to healthy eating habits, forgive yourself and move on.   10. Try new traditions like adding in another vegetable dish or trying a healthier version of family favorites.      For any questions/concerns contact Trang Elliott LPN at [phone] or Patricia Mario RN at [phone]    To schedule appointments with our team, please call [phone]     Please call during clinic hours Monday through Friday 8:00a - 4:00p if you have questions or you can contact us via [employer] at anytime. ?    Lab results will be communicated through My Chart or letter (if My Chart not used). Please call the clinic if you have not received communication after 1 week or if you have any questions.?      Fax: [phone]    Thank you,  Medical Weight Management Team

## 2019-11-19 NOTE — PROGRESS NOTES
"  New Medical Weight Management Consult    PATIENT:  Magdalena Gordon  MRN:         [id number]  :         1965  REMEDOIS:         2019    Dear Alee Graves,    I had the pleasure of seeing your patient, Magdalena Gordon.  Full intake/assessment done to determine barriers to weight loss success and develop a treatment plan.  Magdalena Gordon is a 54 year old female interested in treatment of medical problems associated with weight.  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  If you do not know, please answer: \"I Don't Know\". dr pelaez       Wt Readings from Last 4 Encounters:   19 108.2 kg (238 lb 8 oz)   10/30/19 106.7 kg (235 lb 3.2 oz)   10/31/18 102.5 kg (225 lb 15.5 oz)   17 99.3 kg (219 lb)     She is here because she is concerned that she could not lose weight. She stated that she has gained 10 lbs over the past years for which she attributed to increased stress and emotional eating. She said that her work is very stressful and she traveled a lot for work. She also has sweet craving particularly chocolate, candy or cup cake. She did Weight Watcher in the past and could not continue due to logistic. She did Whole 30 in " Jan 2019 but lost only a few pounds. She has been eating at the restaurant often due to frequent traveling. She is not interested in taking medication for diabetes or weight loss at this time.     She quit smoking 10 years ago.  She works long hours from 8 am to 8 pm. She goes to bed around 8 pm-11 pm.     Weight History Reviewed With Patient 11/19/2019   How concerned are you about your weight? Somewhat Concerned   Would you describe your weight gain as gradual? Yes   I became overweight: As an Adult   The following factors have contributed to my weight gain:  A Health Crisis/Stress, After Quitting Smoking, Eating Wrong Types of Food, Lack of Exercise   I have tried the following methods to lose weight: Watching Portions or Calories, Exercise, Weight Watchers   I have the following family history of obesity/being overweight:  One or more of my siblings are overweight, Many of my relatives are overweight   Has anyone in your family had weight loss surgery? No       Diet Recall Reviewed With Patient 11/19/2019   How many glasses of juice do you drink in a typical day? 0   How many of glasses of milk do you drink in a typical day? 0   How many 8oz glasses of sugar containing drinks such as Natan-Aid/sweet tea do you drink in a day? 0   How many cans/bottles of sugar pop/soda/tea/sports drinks do you drink in a day? 0   How many cans/bottles of diet pop/soda/tea or sports drink do you drink in a day? 0   How often do you have a drink of alcohol? Monthly or Less   If you do drink, how many drinks might you have in a day? 1 or 2       Eating Habits Reviewed With Patient 11/19/2019   Generally, my meals include foods like these: bread, pasta, rice, potatoes, corn, crackers, sweet dessert, pop, or juice. A Few Times a Week   Generally, my meals include foods like these: fried meats, brats, burgers, french fries, pizza, cheese, chips, or ice cream. A Few Times a Week   Eat fast food (like [employer], [employer], [employer]).  Never   Eat at a buffet or sit-down restaurant. A Few Times a Week   Eat most of my meals in front of the TV or computer. Half of the Week   Often skip meals, eat at random times, have no regular eating times. A Few Times a Week   Rarely sit down for a meal but snack or graze throughout.  A Few Times a Week   Eat extra snacks between meals. A Few Times a Week   Eat most of my food at the end of the day. A Few Times a Week   Eat in the middle of the night or wake up at night to eat. Never   Eat extra snacks to prevent or correct low blood sugar. Never   Eat to prevent acid reflux or stomach pain. Never   Worry about not having enough food to eat. Never   Have you been to the food shelf at least a few times this year? No   I eat when I am depressed, stressed, anxious, or bored. A Few Times a Week   I eat when I am happy or as a reward. A Few Times a Week   I feel hungry all the time even if I just have eaten. Never   Feeling full is important to me. Never   Once I start eating, it is hard to stop. Never   I finish all the food on my plate even if I am already full. Never   I can't resist eating delicious food or walk past the good food/smell. Half of the Week   I eat/snack without noticing that I am eating. Never   I eat when I am preparing the meal. Never   I eat more than usual when I see others eating. Never   I have trouble not eating sweets, ice cream, cookies, or chips if they are around the house. A Few Times a Week   I think about food all day. A Few Times a Week   What foods, if any, do you crave? Sweets/Candy/Chocolate   I feel out of control when eating. Never   I eat a large amount of food, like a loaf of bread, a box of cookies, a pint/quart of ice cream, all at once. Never   I eat a large amount of food even when I am not hungry. Never   I eat rapidly. Weekly   I eat alone because I feel embarrassed and do not want others to see how much I have eaten. Never   I eat until I am uncomfortably full. Never    I feel bad, disgusted, or guilty after I overeat. Never   I make myself vomit what I have eaten or use laxatives to get rid of food. Never       Activity/Exercise History Reviewed With Patient 11/19/2019   How much of a typical 12 hour day do you spend sitting? Most of the Day   How much of a typical day do you spend walking/standing? Less Than Half the Day   How many hours (not including work) do you spend on the TV/Video Games/Computer/Tablet/Phone? 1 Hour or Less   How many times a week are you active for the purpose of exercise? 2-3 Times a Week   How many total minutes do you spend doing some activity for the purpose of exercising when you exercise? More Than 30 Minutes   What keeps you from being more active? Pain, Shortness of Breath       PAST MEDICAL HISTORY:  Past Medical History:   Diagnosis Date     Circulation problem      Headache, migraine      Hyperlipemia      Thyroid disease      Uncomplicated asthma        Work/Social History Reviewed With Patient 11/19/2019   My employment status is: Full-Time   My job is:    How much of your job is spent on the computer or phone? 75%   What is your marital status? /In a Relationship   If in a relationship, is your significant other overweight? No   Do you have children? Yes   If you have children, are they overweight? No       Mental Health History Reviewed With Patient 11/19/2019   Have you ever been physically or sexually abused? No   How often in the past 2 weeks have you felt little interest or pleasure in doing things? For Several Days   Over the past 2 weeks how often have you felt down, depressed, or hopeless? For Several Days       Sleep History Reviewed With Patient 11/19/2019   How many hours do you sleep at night? 7   Do you think that you snore loudly or has anybody ever heard you snore loudly (louder than talking or so loud it can be heard behind a shut door)? Yes   Has anyone seen or heard you stop breathing during your sleep?  "Yes   Do you often feel tired, fatigued, or sleepy during the day? Yes       MEDICATIONS:   Current Outpatient Medications   Medication Sig Dispense Refill     aspirin (NEMESIO ASPIRIN) 325 MG EC tablet Take 325 mg by mouth daily        ATENOLOL PO Take 25 mg by mouth daily       calcium carbonate (OS-BASSEM 600 MG [place]. CA) 600 MG tablet Take 1 tablet by mouth daily       Cholecalciferol (VITAMIN D) 2000 units tablet Take 2,000 Units by mouth daily       Fexofenadine HCl (ALLEGRA PO) Take 180 mg by mouth daily       fluticasone (FLONASE) 50 MCG/ACT spray Spray 1 spray into both nostrils daily       fluticasone-salmeterol (ADVAIR) 250-50 MCG/DOSE diskus inhaler Inhale 1 puff into the lungs daily       MONTELUKAST SODIUM PO Take 10 mg by mouth At Bedtime       SYNTHROID 175 MCG tablet Take 1 tablet (175 mcg) by mouth daily 90 tablet 3     vitamin  s/Minerals TABS Take 1 tablet by mouth daily         ALLERGIES:   Allergies   Allergen Reactions     Codeine      Penicillins        PHYSICAL EXAM:  /80 (BP Location: Left arm, Patient Position: Sitting, Cuff Size: Adult Large)   Pulse 60   Temp 98.3  F (36.8  C) (Oral)   Ht 1.702 m (5' 7\")   Wt 108.2 kg (238 lb 8.6 oz)   SpO2 94%   BMI 37.36 kg/m     A & O x 3  HEENT: NCAT, mucous membranes moist  Respirations unlabored  Location of obesity: Mixed Obesity    Lab:      ASSESSMENT:  Magdalena is a patient with mature onset obesity with significant element of familial/genetic influence and with current health consequences. She does not need aggressive weight loss plan.  Magdalena Gordon eats a high carb diet, eats a high fat diet, uses food as a reward, uses food as mood management and eats most meals in front of TV.    Her problem is complicated by strong craving/reward pathways and poor lifestyle choices    Her ability to lose weight is impacted by current work life and lack of confidence.    PLAN:    Decrease eating out  Purge house of food triggers  Keep food diary " by taking a food picture  Dietician visit of education  Calorie/low fat diet  Meal planning  Increase activity as tolerated    Craving/Reward   Ancillary testing:  N/A.  Food Plan:  High protein/low carbohydrate.   Activity Plan:  Exercise after meals.  Supplementary:  N/A.   Medication: She is not interested in weight loss or diabetes medication at this time.    Healthy recipe given  Discussed health  and health psychologist referral -- she will think about it    RTC:    6-8 weeks    TIME: 45 min spent on evaluation, management, counseling, education, & motivational interviewing with greater than 50 % of the total time was spent on counseling and coordinating care    Sincerely,    Tano Blas MD

## 2019-11-19 NOTE — NURSING NOTE
"Chief Complaint   Patient presents with     Consult     Weight Management appointment.       Vitals:    11/19/19 0818   BP: 137/80   BP Location: Left arm   Patient Position: Sitting   Cuff Size: Adult Large   Pulse: 60   Temp: 98.3  F (36.8  C)   TempSrc: Oral   SpO2: 94%   Weight: 108.2 kg (238 lb 8.6 oz)   Height: 1.702 m (5' 7\")       Body mass index is 37.36 kg/m .                            DOMINIC MORALES, EMT    "

## 2020-02-23 ENCOUNTER — HEALTH MAINTENANCE LETTER (OUTPATIENT)
Age: 55
End: 2020-02-23

## 2020-09-30 ENCOUNTER — VIRTUAL VISIT (OUTPATIENT)
Dept: ENDOCRINOLOGY | Facility: CLINIC | Age: 55
End: 2020-09-30
Payer: COMMERCIAL

## 2020-09-30 DIAGNOSIS — C73 PAPILLARY THYROID CARCINOMA (H): ICD-10-CM

## 2020-09-30 DIAGNOSIS — E66.01 CLASS 2 SEVERE OBESITY DUE TO EXCESS CALORIES WITH SERIOUS COMORBIDITY AND BODY MASS INDEX (BMI) OF 36.0 TO 36.9 IN ADULT (H): ICD-10-CM

## 2020-09-30 DIAGNOSIS — R73.03 PREDIABETES: ICD-10-CM

## 2020-09-30 DIAGNOSIS — E66.812 CLASS 2 SEVERE OBESITY DUE TO EXCESS CALORIES WITH SERIOUS COMORBIDITY AND BODY MASS INDEX (BMI) OF 36.0 TO 36.9 IN ADULT (H): ICD-10-CM

## 2020-09-30 DIAGNOSIS — E89.0 POSTSURGICAL HYPOTHYROIDISM: Primary | ICD-10-CM

## 2020-09-30 PROCEDURE — 99214 OFFICE O/P EST MOD 30 MIN: CPT | Mod: GT | Performed by: INTERNAL MEDICINE

## 2020-09-30 NOTE — LETTER
"    9/30/2020         RE: Magdalena Gordon  [address] [id number]        Dear Colleague,    Thank you for referring your patient, Magdalena Gordon, to the [facility]. Please see a copy of my visit note below.    Lab results are in [facility] records.    Magdalena Gordon is a 55 year old female who is being evaluated via a billable telephone visit.      The patient has been notified of following:     \"This telephone visit will be conducted via a call between you and your physician/provider. We have found that certain health care needs can be provided without the need for a physical exam.  This service lets us provide the care you need with a short phone conversation.  If a prescription is necessary we can send it directly to your pharmacy.  If lab work is needed we can place an order for that and you can then stop by our lab to have the test done at a later time.    Telephone visits are billed at different rates depending on your insurance coverage. During this emergency period, for some insurers they may be billed the same as an in-person visit.  Please reach out to your insurance provider with any questions.    If during the course of the call the physician/provider feels a telephone visit is not appropriate, you will not be charged for this service.\"    Patient has given verbal consent for Telephone visit?  Yes    What phone number would you like to be contacted at? [phone]    How would you like to obtain your AVS? Jair Brooks, [facility]  Adult Endocrinology  [facility]      Due to the COVID 19 pandemic this visit was converted to a telephone visit in order to help prevent spread of infection in this patient and the general population.     Phone call start time: 3:30 pm  Phone call end time: 4:00 pm     The patient is seen in f/up for thyroid cancer.    Magdalena Gordon is a 55 year old female with a past medical " history significant for paroxysmal A. fib, hypertension, headaches, obstructive sleep apnea.  The patient was diagnosed with thyroid cancer in 1985.   Outside medical records:  11/7/85 right thyroid lobectomy.  Frozen section at that time was interpreted as probably benign.  Permanent sections revealed papillary carcinoma of the thyroid and the patient underwent elective left thyroid lobectomy the following day.  Pathology report:  Hashimoto thyroiditis  Follicular variant of papillary thyroid carcinoma, 1.5 cm  3 benign peritracheal lymph nodes  A focus of papillary thyroid carcinoma in the left lobe  4/17/86 an ablative dose of 28.5 mCi I-131 was administered.  4/14/86 whole body scan performed 48 hours following I-131 administration.  There was an intense focal area of uptake noted in the thyroid bed, consistent with residual thyroid.  7/30/87 whole body I-131 scan demonstrated no abnormal areas of increased activity.  10/26/88 I-131 whole body scan-no evidence of residual activity in the thyroid area.  No areas of distant metastasis identified.  2009 chest CT - no nodules     2/25/2003  Thyroglobulin antibodies less than 2, thyroglobulin less than 0.9 ([facility])    I reviewed outside lab results and imaging tests in care everywhere:  10/21/85   TgAb 1:400 <1:100  TPO antibodies 1:1600   <1:100    8/1/18   TSH 0.97 Tg <0.1 TgAb <0.1 ([facility])    10/31/18  TSH 0.66  Tg<0.1 TgAb<0.4 ([facility])    The patient used to take 175 mcg of brand Synthroid for many years.  On lab work done through her primary care provider, both in August and September, the TSH was persistently elevated.  The patient reports being compliant in taking levothyroxine as instructed, 1 hour and 30 minutes prior to breakfast.  With breakfast, she generally has coffee, in which she adds a collagen supplement and some coconut milk.  She does not have soy products in the morning.  She takes all her supplements in the  "afternoon.    I reviewed the outside lab results from 8/13/2020:  TSH 6.89, free T4 1, ferritin 9, A1c 6.3%, fasting glucose 111, LDL cholesterol 157, HDL cholesterol 43, triglycerides 166.  TSH was elevated at 13.61 09/18/2020.    She is concerned that her thyroid hormone levels are really low and she might \"crash\".  Since her last visit here, she did meet with a dietitian and with Tano, in November 2019.  Per note, the patient declined treatment with active medications for osteoporosis.  Overall, her weight has been stable since her last visit here.  She underwent nasal surgery for deviated septum and her breathing is significantly improved.  She also underwent right knee replacement surgery in March and left knee replacement surgery in June 2020.  Around the time of surgeries, she was treated with anti-inflammatory medications.  The surgeries went well and she is looking forward to increase her physical activity and start exercise.    She has been feeling tired, not sleeping well.  However, over the last couple weeks, she has been compliant with a dairy and gluten-free diet and she has noticed a significant improvement.  She endorses intermittent loose bowel movements.  She used to have \"gut cramps\" which significantly improved with a gluten-free diet.  This fall, she is considering pursuing retesting for sleep apnea.  She has been using the mouthguard at night but she finds it not helpful.      On recent lab work, the LDL cholesterol was elevated.  The patient has discussed this with her cardiologist, who did not recommend a statin, in view of her overall cardiac evaluation.  Her primary care provider increased the dose of Synthroid from 175 to 200 mcg daily, last Thursday.    Past Medical History   Papillary thyroid cancer   Headaches   Asthma   Varicose veins  Allergic rhinitis  PREET mild - uses a mouth guard   Paroxsymal atrial fibrillation  Gestational diabetes with her third child 1988 - missed during the " pregnancy and not treated , baby was over 10 lbs weight at delivery   Prediabetes - dx 2005 DEXA normal BMD   Uterine fibroids   Menopause at age 45. 2008 FSH 63.8   Iron deficiency   No fractures   Deviated septum   HTN - BP high for 2 years, in the office - not treated     Past Surgical History   Past Surgical History:   Procedure Laterality Date      SECTION, IMMEDIATE HYSTERECTOMY, COMBINED        SECTION, IMMEDIATE HYSTERECTOMY, COMBINED       THYROID BIOPSY     Benign left breast mass removed surgically     Current Medications    Current Outpatient Medications:      aspirin (NEMESIO ASPIRIN) 325 MG EC tablet, Take 325 mg by mouth daily , Disp: , Rfl:      ATENOLOL PO, Take 25 mg by mouth daily, Disp: , Rfl:      calcium carbonate (OS-BASSEM 600 MG [place]. CA) 600 MG tablet, Take 1 tablet by mouth daily, Disp: , Rfl:      Cholecalciferol (VITAMIN D) 2000 units tablet, Take 2,000 Units by mouth daily, Disp: , Rfl:      Fexofenadine HCl (ALLEGRA PO), Take 180 mg by mouth daily, Disp: , Rfl:      fluticasone (FLONASE) 50 MCG/ACT spray, Spray 1 spray into both nostrils daily, Disp: , Rfl:      fluticasone-salmeterol (ADVAIR) 250-50 MCG/DOSE diskus inhaler, Inhale 1 puff into the lungs daily, Disp: , Rfl:      MONTELUKAST SODIUM PO, Take 10 mg by mouth At Bedtime, Disp: , Rfl:      SYNTHROID 175 MCG tablet, Take 1 tablet (175 mcg) by mouth daily, Disp: 90 tablet, Rfl: 3     vitamin  s/Minerals TABS, Take 1 tablet by mouth daily, Disp: , Rfl:     Family History   Problem Relation Age of Onset     Hypothyroidism  Mother      Diabetes type 2  Father      A fib  Father      Diabetes type 2  Maternal Grandfather      Coronary Artery Disease -  of MI in his 70s  Maternal Grandfather      Hashimoto thyroiditis  Sister      Social History  . She has 3 children. She smoked for 30 years, 1 PPD or less, quit 7 years ago. She rarely drinks alcohol. Denies using illicit drugs. 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      Review of Systems   10 point review systems negative unless specified above              Vital Signs     Previous Weights:    Wt Readings from Last 10 Encounters:   11/19/19 108.2 kg (238 lb 8.6 oz)   11/19/19 108.2 kg (238 lb 8 oz)   10/30/19 106.7 kg (235 lb 3.2 oz)   10/31/18 102.5 kg (225 lb 15.5 oz)   01/06/17 99.3 kg (219 lb)   12/02/16 99.3 kg (219 lb)   09/09/16 99.8 kg (220 lb)   02/19/16 98.4 kg (217 lb)        There were no vitals taken for this visit.    Objective:  Psych: Alert and oriented times 3; coherent speech, normal   rate and volume, able to articulate logical thoughts, no tangential thoughts, no hallucinations   or delusions. The affect is normal.    Lab Results  I reviewed prior lab results documented in Epic.  TSH   Date Value Ref Range Status   10/30/2019 2.76 0.40 - 4.00 mU/L Final   10/31/2018 0.66 0.40 - 4.00 mU/L Final   No results found for: A1C    Assessment     1.  Follicular variant papillary thyroid cancer, bilateral, with a maximum diameter of 1.5 cm on the right, status post total thyroidectomy in 1985 followed by radioiodine ablation with 28.5 mCi I-131.  Subsequent whole-body scans have been negative for residual disease/metastasis.  The patient does not remember when was last time she had a thyroid ultrasound done.  Over the years, the thyroglobulin and antithyroglobulin antibodies have remained undetectable, when measured with different assays.   No follow-up required    2. Postsurgical hypothyroidism    The etiology of the mild hypothyroidism is not clear.  Might be due to coffee or the supplements she is taking with her coffee.  Malabsorption remains a consideration.  Clinically, the patient does not endorse definite signs or symptoms suggestive of hypothyroidism.  She is going to have the thyroid hormone levels rechecked through her primary care clinic and contact us if the levels are persistently abnormal.    3.  Obesity, prediabetes  I  recommended to consider treatment with a statin in view of the increased cardiovascular risk associated with prediabetes.  The patient declined.  For now, she prefers to focus on diet, exercise and weight loss and reevaluate the glycemic control in the future.    No orders of the defined types were placed in this encounter.                            Again, thank you for allowing me to participate in the care of your patient.        Sincerely,        Peggy Balderas MD

## 2020-09-30 NOTE — PROGRESS NOTES
"Lab results are in [facility] records.    Magdalena Gordon is a 55 year old female who is being evaluated via a billable telephone visit.      The patient has been notified of following:     \"This telephone visit will be conducted via a call between you and your physician/provider. We have found that certain health care needs can be provided without the need for a physical exam.  This service lets us provide the care you need with a short phone conversation.  If a prescription is necessary we can send it directly to your pharmacy.  If lab work is needed we can place an order for that and you can then stop by our lab to have the test done at a later time.    Telephone visits are billed at different rates depending on your insurance coverage. During this emergency period, for some insurers they may be billed the same as an in-person visit.  Please reach out to your insurance provider with any questions.    If during the course of the call the physician/provider feels a telephone visit is not appropriate, you will not be charged for this service.\"    Patient has given verbal consent for Telephone visit?  Yes    What phone number would you like to be contacted at? [phone]    How would you like to obtain your AVS? Jair Brooks, [facility]  Adult Endocrinology  [facility]    "

## 2020-09-30 NOTE — PROGRESS NOTES
Due to the COVID 19 pandemic this visit was converted to a telephone visit in order to help prevent spread of infection in this patient and the general population.     Phone call start time: 3:30 pm  Phone call end time: 4:00 pm     The patient is seen in f/up for thyroid cancer.    Magdalena Gordon is a 55 year old female with a past medical history significant for paroxysmal A. fib, hypertension, headaches, obstructive sleep apnea.  The patient was diagnosed with thyroid cancer in 1985.   Outside medical records:  11/7/85 right thyroid lobectomy.  Frozen section at that time was interpreted as probably benign.  Permanent sections revealed papillary carcinoma of the thyroid and the patient underwent elective left thyroid lobectomy the following day.  Pathology report:  Hashimoto thyroiditis  Follicular variant of papillary thyroid carcinoma, 1.5 cm  3 benign peritracheal lymph nodes  A focus of papillary thyroid carcinoma in the left lobe  4/17/86 an ablative dose of 28.5 mCi I-131 was administered.  4/14/86 whole body scan performed 48 hours following I-131 administration.  There was an intense focal area of uptake noted in the thyroid bed, consistent with residual thyroid.  7/30/87 whole body I-131 scan demonstrated no abnormal areas of increased activity.  10/26/88 I-131 whole body scan-no evidence of residual activity in the thyroid area.  No areas of distant metastasis identified.  2009 chest CT - no nodules     2/25/2003  Thyroglobulin antibodies less than 2, thyroglobulin less than 0.9 ([facility])    I reviewed outside lab results and imaging tests in care everywhere:  10/21/85   TgAb 1:400 <1:100  TPO antibodies 1:1600   <1:100    8/1/18   TSH 0.97 Tg <0.1 TgAb <0.1 ([facility])    10/31/18  TSH 0.66  Tg<0.1 TgAb<0.4 ([facility])    The patient used to take 175 mcg of brand Synthroid for many years.  On lab work done through her primary care provider, both in August and September, the TSH  "was persistently elevated.  The patient reports being compliant in taking levothyroxine as instructed, 1 hour and 30 minutes prior to breakfast.  With breakfast, she generally has coffee, in which she adds a collagen supplement and some coconut milk.  She does not have soy products in the morning.  She takes all her supplements in the afternoon.    I reviewed the outside lab results from 8/13/2020:  TSH 6.89, free T4 1, ferritin 9, A1c 6.3%, fasting glucose 111, LDL cholesterol 157, HDL cholesterol 43, triglycerides 166.  TSH was elevated at 13.61 09/18/2020.    She is concerned that her thyroid hormone levels are really low and she might \"crash\".  Since her last visit here, she did meet with a dietitian and with [facility], in November 2019.  Per note, the patient declined treatment with active medications for osteoporosis.  Overall, her weight has been stable since her last visit here.  She underwent nasal surgery for deviated septum and her breathing is significantly improved.  She also underwent right knee replacement surgery in March and left knee replacement surgery in June 2020.  Around the time of surgeries, she was treated with anti-inflammatory medications.  The surgeries went well and she is looking forward to increase her physical activity and start exercise.    She has been feeling tired, not sleeping well.  However, over the last couple weeks, she has been compliant with a dairy and gluten-free diet and she has noticed a significant improvement.  She endorses intermittent loose bowel movements.  She used to have \"gut cramps\" which significantly improved with a gluten-free diet.  This fall, she is considering pursuing retesting for sleep apnea.  She has been using the mouthguard at night but she finds it not helpful.      On recent lab work, the LDL cholesterol was elevated.  The patient has discussed this with her cardiologist, who did not recommend a statin, in view of her overall cardiac evaluation.  Her " primary care provider increased the dose of Synthroid from 175 to 200 mcg daily, last Thursday.    Past Medical History   Papillary thyroid cancer   Headaches   Asthma   Varicose veins  Allergic rhinitis  PREET mild - uses a mouth guard   Paroxsymal atrial fibrillation  Gestational diabetes with her third child  - missed during the pregnancy and not treated , baby was over 10 lbs weight at delivery   Prediabetes - dx 2005 DEXA normal BMD   Uterine fibroids   Menopause at age 45. 2008 FSH 63.8   Iron deficiency   No fractures   Deviated septum   HTN - BP high for 2 years, in the office - not treated     Past Surgical History   Past Surgical History:   Procedure Laterality Date      SECTION, IMMEDIATE HYSTERECTOMY, COMBINED        SECTION, IMMEDIATE HYSTERECTOMY, COMBINED       THYROID BIOPSY     Benign left breast mass removed surgically     Current Medications    Current Outpatient Medications:      aspirin (NEMESIO ASPIRIN) 325 MG EC tablet, Take 325 mg by mouth daily , Disp: , Rfl:      ATENOLOL PO, Take 25 mg by mouth daily, Disp: , Rfl:      calcium carbonate (OS-BASSEM 600 MG [place]) 600 MG tablet, Take 1 tablet by mouth daily, Disp: , Rfl:      Cholecalciferol (VITAMIN D) 2000 units tablet, Take 2,000 Units by mouth daily, Disp: , Rfl:      Fexofenadine HCl (ALLEGRA PO), Take 180 mg by mouth daily, Disp: , Rfl:      fluticasone (FLONASE) 50 MCG/ACT spray, Spray 1 spray into both nostrils daily, Disp: , Rfl:      fluticasone-salmeterol (ADVAIR) 250-50 MCG/DOSE diskus inhaler, Inhale 1 puff into the lungs daily, Disp: , Rfl:      MONTELUKAST SODIUM PO, Take 10 mg by mouth At Bedtime, Disp: , Rfl:      SYNTHROID 175 MCG tablet, Take 1 tablet (175 mcg) by mouth daily, Disp: 90 tablet, Rfl: 3     vitamin  s/Minerals TABS, Take 1 tablet by mouth daily, Disp: , Rfl:     Family History   Problem Relation Age of Onset     Hypothyroidism  Mother      Diabetes type 2  Father      A fib   Father      Diabetes type 2  Maternal Grandfather      Coronary Artery Disease -  of MI in his 70s  Maternal Grandfather      Hashimoto thyroiditis  Sister      Social History  . She has 3 children. She smoked for 30 years, 1 PPD or less, quit 7 years ago. She rarely drinks alcohol. Denies using illicit drugs. 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      Review of Systems   10 point review systems negative unless specified above              Vital Signs     Previous Weights:    Wt Readings from Last 10 Encounters:   19 108.2 kg (238 lb 8.6 oz)   19 108.2 kg (238 lb 8 oz)   10/30/19 106.7 kg (235 lb 3.2 oz)   10/31/18 102.5 kg (225 lb 15.5 oz)   17 99.3 kg (219 lb)   16 99.3 kg (219 lb)   16 99.8 kg (220 lb)   16 98.4 kg (217 lb)        There were no vitals taken for this visit.    Objective:  Psych: Alert and oriented times 3; coherent speech, normal   rate and volume, able to articulate logical thoughts, no tangential thoughts, no hallucinations   or delusions. The affect is normal.    Lab Results  I reviewed prior lab results documented in Epic.  TSH   Date Value Ref Range Status   10/30/2019 2.76 0.40 - 4.00 mU/L Final   10/31/2018 0.66 0.40 - 4.00 mU/L Final   No results found for: A1C    Assessment     1.  Follicular variant papillary thyroid cancer, bilateral, with a maximum diameter of 1.5 cm on the right, status post total thyroidectomy in  followed by radioiodine ablation with 28.5 mCi I-131.  Subsequent whole-body scans have been negative for residual disease/metastasis.  The patient does not remember when was last time she had a thyroid ultrasound done.  Over the years, the thyroglobulin and antithyroglobulin antibodies have remained undetectable, when measured with different assays.   No follow-up required    2. Postsurgical hypothyroidism    The etiology of the mild hypothyroidism is not clear.  Might be due to coffee or the supplements she is  taking with her coffee.  Malabsorption remains a consideration.  Clinically, the patient does not endorse definite signs or symptoms suggestive of hypothyroidism.  She is going to have the thyroid hormone levels rechecked through her primary care clinic and contact us if the levels are persistently abnormal.    3.  Obesity, prediabetes  I recommended to consider treatment with a statin in view of the increased cardiovascular risk associated with prediabetes.  The patient declined.  For now, she prefers to focus on diet, exercise and weight loss and reevaluate the glycemic control in the future.    No orders of the defined types were placed in this encounter.

## 2020-12-06 ENCOUNTER — HEALTH MAINTENANCE LETTER (OUTPATIENT)
Age: 55
End: 2020-12-06

## 2021-09-25 ENCOUNTER — HEALTH MAINTENANCE LETTER (OUTPATIENT)
Age: 56
End: 2021-09-25

## 2022-01-15 ENCOUNTER — HEALTH MAINTENANCE LETTER (OUTPATIENT)
Age: 57
End: 2022-01-15

## 2022-02-25 NOTE — PROGRESS NOTES
Pt has not returned to PT and is assumed to be resolved. She did not return as agreed.     Quality 130: Documentation Of Current Medications In The Medical Record: Current Medications Documented Detail Level: Detailed Quality 431: Preventive Care And Screening: Unhealthy Alcohol Use - Screening: Patient not identified as an unhealthy alcohol user when screened for unhealthy alcohol use using a systematic screening method Quality 226: Preventive Care And Screening: Tobacco Use: Screening And Cessation Intervention: Patient screened for tobacco use and is an ex/non-smoker

## 2023-01-07 ENCOUNTER — HEALTH MAINTENANCE LETTER (OUTPATIENT)
Age: 58
End: 2023-01-07

## 2023-04-22 ENCOUNTER — HEALTH MAINTENANCE LETTER (OUTPATIENT)
Age: 58
End: 2023-04-22

## 2024-02-10 ENCOUNTER — HEALTH MAINTENANCE LETTER (OUTPATIENT)
Age: 59
End: 2024-02-10

## 2024-04-29 ENCOUNTER — TELEPHONE (OUTPATIENT)
Dept: FAMILY MEDICINE | Facility: CLINIC | Age: 59
End: 2024-04-29
Payer: COMMERCIAL

## 2024-04-29 NOTE — TELEPHONE ENCOUNTER
Reason for Call:  Appointment Request    Patient requesting this type of appt:  Preventive     Requested provider:  Shiloh Corrales MD    Reason patient unable to be scheduled: Not with their preferred provider    When does patient want to be seen/preferred time:  Late July 2024 early morning or late afternoon    Comments: Patient is requesting to establish care with listed provider and schedule a yearly physical. She currently lost her primary care provider and strongly prefers to establish care with listed provider. Provider was recommended to patient. First thing in the morning or very last appointments of the day tend to work best for patient due to work schedule.    Could we send this information to you in [employer] or would you prefer to receive a phone call?:   Patient would prefer a phone call   Okay to leave a detailed message?: Yes at Cell number on file:    Telephone Information:   Mobile [phone]       Call taken on 4/29/2024 at 4:26 PM by Katarzyna Desai

## 2024-06-29 ENCOUNTER — HEALTH MAINTENANCE LETTER (OUTPATIENT)
Age: 59
End: 2024-06-29